# Patient Record
Sex: FEMALE | Race: WHITE | NOT HISPANIC OR LATINO | Employment: UNEMPLOYED | URBAN - METROPOLITAN AREA
[De-identification: names, ages, dates, MRNs, and addresses within clinical notes are randomized per-mention and may not be internally consistent; named-entity substitution may affect disease eponyms.]

---

## 2017-01-01 ENCOUNTER — APPOINTMENT (INPATIENT)
Dept: RADIOLOGY | Facility: HOSPITAL | Age: 82
DRG: 871 | End: 2017-01-01
Attending: GENERAL PRACTICE
Payer: MEDICARE

## 2017-01-01 ENCOUNTER — HOSPITAL ENCOUNTER (INPATIENT)
Facility: HOSPITAL | Age: 82
LOS: 3 days | Discharge: NURSING, REHAB OR LONG-TERM CARE | DRG: 871 | End: 2017-07-31
Attending: EMERGENCY MEDICINE | Admitting: INTERNAL MEDICINE
Payer: MEDICARE

## 2017-01-01 ENCOUNTER — APPOINTMENT (EMERGENCY)
Dept: CT IMAGING | Facility: HOSPITAL | Age: 82
DRG: 189 | End: 2017-01-01
Payer: MEDICARE

## 2017-01-01 ENCOUNTER — HOSPITAL ENCOUNTER (EMERGENCY)
Facility: HOSPITAL | Age: 82
End: 2017-11-20
Attending: EMERGENCY MEDICINE | Admitting: EMERGENCY MEDICINE
Payer: MEDICARE

## 2017-01-01 ENCOUNTER — APPOINTMENT (EMERGENCY)
Dept: CT IMAGING | Facility: HOSPITAL | Age: 82
DRG: 871 | End: 2017-01-01
Payer: MEDICARE

## 2017-01-01 ENCOUNTER — APPOINTMENT (EMERGENCY)
Dept: RADIOLOGY | Facility: HOSPITAL | Age: 82
End: 2017-01-01
Payer: MEDICARE

## 2017-01-01 ENCOUNTER — APPOINTMENT (EMERGENCY)
Dept: CT IMAGING | Facility: HOSPITAL | Age: 82
End: 2017-01-01
Payer: MEDICARE

## 2017-01-01 ENCOUNTER — HOSPITAL ENCOUNTER (INPATIENT)
Facility: HOSPITAL | Age: 82
LOS: 2 days | Discharge: NURSING, REHAB OR LONG-TERM CARE | DRG: 189 | End: 2017-08-07
Attending: EMERGENCY MEDICINE | Admitting: INTERNAL MEDICINE
Payer: MEDICARE

## 2017-01-01 ENCOUNTER — HOSPITAL ENCOUNTER (INPATIENT)
Facility: HOSPITAL | Age: 82
LOS: 5 days | DRG: 871 | End: 2017-11-25
Attending: INTERNAL MEDICINE | Admitting: HOSPITALIST
Payer: MEDICARE

## 2017-01-01 ENCOUNTER — APPOINTMENT (INPATIENT)
Dept: RADIOLOGY | Facility: HOSPITAL | Age: 82
DRG: 871 | End: 2017-01-01
Payer: MEDICARE

## 2017-01-01 ENCOUNTER — APPOINTMENT (EMERGENCY)
Dept: RADIOLOGY | Facility: HOSPITAL | Age: 82
DRG: 871 | End: 2017-01-01
Payer: MEDICARE

## 2017-01-01 ENCOUNTER — APPOINTMENT (EMERGENCY)
Dept: RADIOLOGY | Facility: HOSPITAL | Age: 82
DRG: 189 | End: 2017-01-01
Payer: MEDICARE

## 2017-01-01 VITALS
RESPIRATION RATE: 16 BRPM | WEIGHT: 125.88 LBS | SYSTOLIC BLOOD PRESSURE: 120 MMHG | BODY MASS INDEX: 23.17 KG/M2 | TEMPERATURE: 97.5 F | HEART RATE: 66 BPM | OXYGEN SATURATION: 92 % | HEIGHT: 62 IN | DIASTOLIC BLOOD PRESSURE: 49 MMHG

## 2017-01-01 VITALS
OXYGEN SATURATION: 94 % | DIASTOLIC BLOOD PRESSURE: 66 MMHG | HEIGHT: 60 IN | BODY MASS INDEX: 27.35 KG/M2 | HEART RATE: 82 BPM | TEMPERATURE: 98.1 F | WEIGHT: 139.33 LBS | SYSTOLIC BLOOD PRESSURE: 142 MMHG | RESPIRATION RATE: 16 BRPM

## 2017-01-01 VITALS
TEMPERATURE: 98.7 F | BODY MASS INDEX: 24.58 KG/M2 | RESPIRATION RATE: 18 BRPM | OXYGEN SATURATION: 99 % | WEIGHT: 125.88 LBS | DIASTOLIC BLOOD PRESSURE: 63 MMHG | HEART RATE: 88 BPM | SYSTOLIC BLOOD PRESSURE: 144 MMHG

## 2017-01-01 VITALS
RESPIRATION RATE: 24 BRPM | OXYGEN SATURATION: 98 % | SYSTOLIC BLOOD PRESSURE: 140 MMHG | TEMPERATURE: 97.4 F | WEIGHT: 154.54 LBS | HEART RATE: 83 BPM | DIASTOLIC BLOOD PRESSURE: 65 MMHG | BODY MASS INDEX: 30.18 KG/M2

## 2017-01-01 DIAGNOSIS — R09.02 HYPOXIA: ICD-10-CM

## 2017-01-01 DIAGNOSIS — J44.9 COPD (CHRONIC OBSTRUCTIVE PULMONARY DISEASE) (HCC): Chronic | ICD-10-CM

## 2017-01-01 DIAGNOSIS — J18.9 HCAP (HEALTHCARE-ASSOCIATED PNEUMONIA): ICD-10-CM

## 2017-01-01 DIAGNOSIS — J96.01 ACUTE RESPIRATORY FAILURE WITH HYPOXIA (HCC): ICD-10-CM

## 2017-01-01 DIAGNOSIS — A41.9 SEVERE SEPSIS (HCC): ICD-10-CM

## 2017-01-01 DIAGNOSIS — R65.20 SEVERE SEPSIS (HCC): ICD-10-CM

## 2017-01-01 DIAGNOSIS — N39.0 UTI (URINARY TRACT INFECTION): ICD-10-CM

## 2017-01-01 DIAGNOSIS — J18.9 BILATERAL PNEUMONIA: Primary | ICD-10-CM

## 2017-01-01 DIAGNOSIS — R65.20 SEVERE SEPSIS (HCC): Primary | ICD-10-CM

## 2017-01-01 DIAGNOSIS — J44.1 COPD EXACERBATION (HCC): ICD-10-CM

## 2017-01-01 DIAGNOSIS — J18.9 PNEUMONIA: Primary | ICD-10-CM

## 2017-01-01 DIAGNOSIS — J44.9 COPD (CHRONIC OBSTRUCTIVE PULMONARY DISEASE) (HCC): ICD-10-CM

## 2017-01-01 DIAGNOSIS — R06.03 RESPIRATORY DISTRESS: ICD-10-CM

## 2017-01-01 DIAGNOSIS — A41.9 SEVERE SEPSIS (HCC): Primary | ICD-10-CM

## 2017-01-01 LAB
ALBUMIN SERPL BCP-MCNC: 2.7 G/DL (ref 3.5–5)
ALBUMIN SERPL BCP-MCNC: 3.2 G/DL (ref 3.5–5)
ALBUMIN SERPL BCP-MCNC: 3.5 G/DL (ref 3.5–5)
ALBUMIN SERPL BCP-MCNC: 3.6 G/DL (ref 3.5–5)
ALP SERPL-CCNC: 125 U/L (ref 46–116)
ALP SERPL-CCNC: 141 U/L (ref 46–116)
ALP SERPL-CCNC: 155 U/L (ref 46–116)
ALP SERPL-CCNC: 197 U/L (ref 46–116)
ALT SERPL W P-5'-P-CCNC: 26 U/L (ref 12–78)
ALT SERPL W P-5'-P-CCNC: 29 U/L (ref 12–78)
ALT SERPL W P-5'-P-CCNC: 30 U/L (ref 12–78)
ALT SERPL W P-5'-P-CCNC: 33 U/L (ref 12–78)
AMORPH PHOS CRY URNS QL MICRO: ABNORMAL /HPF
ANION GAP SERPL CALCULATED.3IONS-SCNC: 11 MMOL/L (ref 4–13)
ANION GAP SERPL CALCULATED.3IONS-SCNC: 11 MMOL/L (ref 4–13)
ANION GAP SERPL CALCULATED.3IONS-SCNC: 14 MMOL/L (ref 4–13)
ANION GAP SERPL CALCULATED.3IONS-SCNC: 15 MMOL/L (ref 4–13)
ANION GAP SERPL CALCULATED.3IONS-SCNC: 6 MMOL/L (ref 4–13)
ANION GAP SERPL CALCULATED.3IONS-SCNC: 7 MMOL/L (ref 4–13)
ANION GAP SERPL CALCULATED.3IONS-SCNC: 7 MMOL/L (ref 4–13)
ANION GAP SERPL CALCULATED.3IONS-SCNC: 8 MMOL/L (ref 4–13)
ANION GAP SERPL CALCULATED.3IONS-SCNC: 9 MMOL/L (ref 4–13)
ANION GAP SERPL CALCULATED.3IONS-SCNC: 9 MMOL/L (ref 4–13)
APTT PPP: 27 SECONDS (ref 23–35)
APTT PPP: 30 SECONDS (ref 23–35)
ARTERIAL PATENCY WRIST A: 3
AST SERPL W P-5'-P-CCNC: 19 U/L (ref 5–45)
AST SERPL W P-5'-P-CCNC: 25 U/L (ref 5–45)
AST SERPL W P-5'-P-CCNC: 32 U/L (ref 5–45)
AST SERPL W P-5'-P-CCNC: 52 U/L (ref 5–45)
ATRIAL RATE: 102 BPM
ATRIAL RATE: 220 BPM
ATRIAL RATE: 93 BPM
BACTERIA BLD CULT: NORMAL
BACTERIA UR CULT: NORMAL
BACTERIA UR QL AUTO: ABNORMAL /HPF
BASE EXCESS BLDA CALC-SCNC: -10 MMOL/L (ref -2–3)
BASE EXCESS BLDA CALC-SCNC: 4 MMOL/L (ref -2–3)
BASOPHILS # BLD AUTO: 0.04 THOUSANDS/ΜL (ref 0–0.1)
BASOPHILS # BLD AUTO: 0.05 THOUSANDS/ΜL (ref 0–0.1)
BASOPHILS # BLD MANUAL: 0 THOUSAND/UL (ref 0–0.1)
BASOPHILS NFR BLD AUTO: 0 % (ref 0–1)
BASOPHILS NFR MAR MANUAL: 0 % (ref 0–1)
BILIRUB DIRECT SERPL-MCNC: 0.11 MG/DL (ref 0–0.2)
BILIRUB SERPL-MCNC: 0.3 MG/DL (ref 0.2–1)
BILIRUB SERPL-MCNC: 0.4 MG/DL (ref 0.2–1)
BILIRUB SERPL-MCNC: 0.4 MG/DL (ref 0.2–1)
BILIRUB SERPL-MCNC: 0.6 MG/DL (ref 0.2–1)
BILIRUB UR QL STRIP: NEGATIVE
BUN SERPL-MCNC: 15 MG/DL (ref 5–25)
BUN SERPL-MCNC: 16 MG/DL (ref 5–25)
BUN SERPL-MCNC: 18 MG/DL (ref 5–25)
BUN SERPL-MCNC: 18 MG/DL (ref 5–25)
BUN SERPL-MCNC: 22 MG/DL (ref 5–25)
BUN SERPL-MCNC: 23 MG/DL (ref 5–25)
BUN SERPL-MCNC: 23 MG/DL (ref 5–25)
BUN SERPL-MCNC: 24 MG/DL (ref 5–25)
BUN SERPL-MCNC: 25 MG/DL (ref 5–25)
BUN SERPL-MCNC: 25 MG/DL (ref 5–25)
CA-I BLD-SCNC: 1.37 MMOL/L (ref 1.12–1.32)
CA-I BLD-SCNC: 1.42 MMOL/L (ref 1.12–1.32)
CALCIUM SERPL-MCNC: 10 MG/DL (ref 8.3–10.1)
CALCIUM SERPL-MCNC: 10.1 MG/DL (ref 8.3–10.1)
CALCIUM SERPL-MCNC: 10.2 MG/DL (ref 8.3–10.1)
CALCIUM SERPL-MCNC: 11.1 MG/DL (ref 8.3–10.1)
CALCIUM SERPL-MCNC: 8.2 MG/DL (ref 8.3–10.1)
CALCIUM SERPL-MCNC: 8.3 MG/DL (ref 8.3–10.1)
CALCIUM SERPL-MCNC: 8.4 MG/DL (ref 8.3–10.1)
CALCIUM SERPL-MCNC: 8.8 MG/DL (ref 8.3–10.1)
CALCIUM SERPL-MCNC: 9 MG/DL (ref 8.3–10.1)
CALCIUM SERPL-MCNC: 9.3 MG/DL (ref 8.3–10.1)
CHLORIDE SERPL-SCNC: 102 MMOL/L (ref 100–108)
CHLORIDE SERPL-SCNC: 102 MMOL/L (ref 100–108)
CHLORIDE SERPL-SCNC: 104 MMOL/L (ref 100–108)
CHLORIDE SERPL-SCNC: 104 MMOL/L (ref 100–108)
CHLORIDE SERPL-SCNC: 105 MMOL/L (ref 100–108)
CHLORIDE SERPL-SCNC: 106 MMOL/L (ref 100–108)
CHLORIDE SERPL-SCNC: 115 MMOL/L (ref 100–108)
CHLORIDE SERPL-SCNC: 116 MMOL/L (ref 100–108)
CHLORIDE SERPL-SCNC: 118 MMOL/L (ref 100–108)
CHLORIDE SERPL-SCNC: 99 MMOL/L (ref 100–108)
CLARITY UR: ABNORMAL
CLARITY UR: CLEAR
CLARITY UR: CLEAR
CLARITY, POC: CLEAR
CO2 SERPL-SCNC: 18 MMOL/L (ref 21–32)
CO2 SERPL-SCNC: 20 MMOL/L (ref 21–32)
CO2 SERPL-SCNC: 22 MMOL/L (ref 21–32)
CO2 SERPL-SCNC: 22 MMOL/L (ref 21–32)
CO2 SERPL-SCNC: 24 MMOL/L (ref 21–32)
CO2 SERPL-SCNC: 25 MMOL/L (ref 21–32)
CO2 SERPL-SCNC: 25 MMOL/L (ref 21–32)
CO2 SERPL-SCNC: 26 MMOL/L (ref 21–32)
CO2 SERPL-SCNC: 27 MMOL/L (ref 21–32)
CO2 SERPL-SCNC: 28 MMOL/L (ref 21–32)
COLOR UR: YELLOW
COLOR, POC: YELLOW
CREAT SERPL-MCNC: 0.83 MG/DL (ref 0.6–1.3)
CREAT SERPL-MCNC: 0.84 MG/DL (ref 0.6–1.3)
CREAT SERPL-MCNC: 0.88 MG/DL (ref 0.6–1.3)
CREAT SERPL-MCNC: 0.89 MG/DL (ref 0.6–1.3)
CREAT SERPL-MCNC: 1.02 MG/DL (ref 0.6–1.3)
CREAT SERPL-MCNC: 1.04 MG/DL (ref 0.6–1.3)
CREAT SERPL-MCNC: 1.1 MG/DL (ref 0.6–1.3)
CREAT SERPL-MCNC: 1.16 MG/DL (ref 0.6–1.3)
CREAT SERPL-MCNC: 1.16 MG/DL (ref 0.6–1.3)
CREAT SERPL-MCNC: 1.36 MG/DL (ref 0.6–1.3)
DEPRECATED D DIMER PPP: 1590 NG/ML (FEU) (ref 0–424)
DS:DELIVERY SYSTEM: ABNORMAL
EOSINOPHIL # BLD AUTO: 0.1 THOUSAND/ΜL (ref 0–0.61)
EOSINOPHIL # BLD AUTO: 0.11 THOUSAND/ΜL (ref 0–0.61)
EOSINOPHIL # BLD AUTO: 0.14 THOUSAND/ΜL (ref 0–0.61)
EOSINOPHIL # BLD AUTO: 0.5 THOUSAND/ΜL (ref 0–0.61)
EOSINOPHIL # BLD MANUAL: 0 THOUSAND/UL (ref 0–0.4)
EOSINOPHIL NFR BLD AUTO: 1 % (ref 0–6)
EOSINOPHIL NFR BLD AUTO: 4 % (ref 0–6)
EOSINOPHIL NFR BLD MANUAL: 0 % (ref 0–6)
ERYTHROCYTE [DISTWIDTH] IN BLOOD BY AUTOMATED COUNT: 15.2 % (ref 11.6–15.1)
ERYTHROCYTE [DISTWIDTH] IN BLOOD BY AUTOMATED COUNT: 15.3 % (ref 11.6–15.1)
ERYTHROCYTE [DISTWIDTH] IN BLOOD BY AUTOMATED COUNT: 15.4 % (ref 11.6–15.1)
ERYTHROCYTE [DISTWIDTH] IN BLOOD BY AUTOMATED COUNT: 15.5 % (ref 11.6–15.1)
ERYTHROCYTE [DISTWIDTH] IN BLOOD BY AUTOMATED COUNT: 15.6 % (ref 11.6–15.1)
ERYTHROCYTE [DISTWIDTH] IN BLOOD BY AUTOMATED COUNT: 15.7 % (ref 11.6–15.1)
ERYTHROCYTE [DISTWIDTH] IN BLOOD BY AUTOMATED COUNT: 16.2 % (ref 11.6–15.1)
ERYTHROCYTE [DISTWIDTH] IN BLOOD BY AUTOMATED COUNT: 16.4 % (ref 11.6–15.1)
ERYTHROCYTE [DISTWIDTH] IN BLOOD BY AUTOMATED COUNT: 16.5 % (ref 11.6–15.1)
ERYTHROCYTE [DISTWIDTH] IN BLOOD BY AUTOMATED COUNT: 16.6 % (ref 11.6–15.1)
EXT BILIRUBIN, UA: NEGATIVE
EXT BLOOD URINE: NEGATIVE
EXT GLUCOSE, UA: NEGATIVE
EXT KETONES: NORMAL
EXT NITRITE, UA: NEGATIVE
EXT PH, UA: 7.5
EXT PROTEIN, UA: NORMAL
EXT SPECIFIC GRAVITY, UA: 1.01
EXT UROBILINOGEN: 0.2
FIO2 GAS DIL.REBREATH: 44 L
FLUAV AG SPEC QL: NORMAL
FLUBV AG SPEC QL: NORMAL
GFR SERPL CREATININE-BSD FRML MDRD: 34 ML/MIN/1.73SQ M
GFR SERPL CREATININE-BSD FRML MDRD: 42 ML/MIN/1.73SQ M
GFR SERPL CREATININE-BSD FRML MDRD: 42 ML/MIN/1.73SQ M
GFR SERPL CREATININE-BSD FRML MDRD: 44 ML/MIN/1.73SQ M
GFR SERPL CREATININE-BSD FRML MDRD: 47 ML/MIN/1.73SQ M
GFR SERPL CREATININE-BSD FRML MDRD: 49 ML/MIN/1.73SQ M
GFR SERPL CREATININE-BSD FRML MDRD: 57 ML/MIN/1.73SQ M
GFR SERPL CREATININE-BSD FRML MDRD: 58 ML/MIN/1.73SQ M
GFR SERPL CREATININE-BSD FRML MDRD: 61 ML/MIN/1.73SQ M
GFR SERPL CREATININE-BSD FRML MDRD: 62 ML/MIN/1.73SQ M
GLUCOSE SERPL-MCNC: 105 MG/DL (ref 65–140)
GLUCOSE SERPL-MCNC: 107 MG/DL (ref 65–140)
GLUCOSE SERPL-MCNC: 115 MG/DL (ref 65–140)
GLUCOSE SERPL-MCNC: 115 MG/DL (ref 65–140)
GLUCOSE SERPL-MCNC: 116 MG/DL (ref 65–140)
GLUCOSE SERPL-MCNC: 134 MG/DL (ref 65–140)
GLUCOSE SERPL-MCNC: 134 MG/DL (ref 65–140)
GLUCOSE SERPL-MCNC: 142 MG/DL (ref 65–140)
GLUCOSE SERPL-MCNC: 143 MG/DL (ref 65–140)
GLUCOSE SERPL-MCNC: 146 MG/DL (ref 65–140)
GLUCOSE SERPL-MCNC: 158 MG/DL (ref 65–140)
GLUCOSE SERPL-MCNC: 158 MG/DL (ref 65–140)
GLUCOSE SERPL-MCNC: 161 MG/DL (ref 65–140)
GLUCOSE UR STRIP-MCNC: NEGATIVE MG/DL
HCO3 BLDA-SCNC: 19.4 MMOL/L (ref 24–30)
HCO3 BLDA-SCNC: 28.6 MMOL/L (ref 22–28)
HCT VFR BLD AUTO: 36.3 % (ref 34.8–46.1)
HCT VFR BLD AUTO: 38.1 % (ref 34.8–46.1)
HCT VFR BLD AUTO: 38.7 % (ref 34.8–46.1)
HCT VFR BLD AUTO: 38.8 % (ref 34.8–46.1)
HCT VFR BLD AUTO: 40.8 % (ref 34.8–46.1)
HCT VFR BLD AUTO: 40.9 % (ref 34.8–46.1)
HCT VFR BLD AUTO: 42.5 % (ref 34.8–46.1)
HCT VFR BLD AUTO: 44.1 % (ref 34.8–46.1)
HCT VFR BLD AUTO: 44.1 % (ref 34.8–46.1)
HCT VFR BLD AUTO: 47.9 % (ref 34.8–46.1)
HCT VFR BLD CALC: 41 % (ref 34.8–46.1)
HCT VFR BLD CALC: 42 % (ref 34.8–46.1)
HGB BLD-MCNC: 11.8 G/DL (ref 11.5–15.4)
HGB BLD-MCNC: 12.3 G/DL (ref 11.5–15.4)
HGB BLD-MCNC: 12.4 G/DL (ref 11.5–15.4)
HGB BLD-MCNC: 12.4 G/DL (ref 11.5–15.4)
HGB BLD-MCNC: 13 G/DL (ref 11.5–15.4)
HGB BLD-MCNC: 13.3 G/DL (ref 11.5–15.4)
HGB BLD-MCNC: 13.5 G/DL (ref 11.5–15.4)
HGB BLD-MCNC: 14.1 G/DL (ref 11.5–15.4)
HGB BLD-MCNC: 14.3 G/DL (ref 11.5–15.4)
HGB BLD-MCNC: 15.2 G/DL (ref 11.5–15.4)
HGB BLDA-MCNC: 13.9 G/DL (ref 11.5–15.4)
HGB BLDA-MCNC: 14.3 G/DL (ref 11.5–15.4)
HGB UR QL STRIP.AUTO: ABNORMAL
HGB UR QL STRIP.AUTO: NEGATIVE
HGB UR QL STRIP.AUTO: NEGATIVE
INR PPP: 1.06 (ref 0.86–1.16)
INR PPP: 1.08 (ref 0.86–1.16)
INR PPP: 1.09 (ref 0.86–1.16)
KETONES UR STRIP-MCNC: ABNORMAL MG/DL
L PNEUMO1 AG UR QL IA.RAPID: NEGATIVE
LACTATE SERPL-SCNC: 1.4 MMOL/L (ref 0.5–2)
LACTATE SERPL-SCNC: 1.5 MMOL/L (ref 0.5–2)
LACTATE SERPL-SCNC: 1.6 MMOL/L (ref 0.5–2)
LACTATE SERPL-SCNC: 2.1 MMOL/L (ref 0.5–2)
LACTATE SERPL-SCNC: 2.3 MMOL/L (ref 0.5–2)
LACTATE SERPL-SCNC: 2.3 MMOL/L (ref 0.5–2)
LACTATE SERPL-SCNC: 2.4 MMOL/L (ref 0.5–2)
LACTATE SERPL-SCNC: 2.5 MMOL/L (ref 0.5–2)
LACTATE SERPL-SCNC: 2.7 MMOL/L (ref 0.5–2)
LACTATE SERPL-SCNC: 4.2 MMOL/L (ref 0.5–2)
LEUKOCYTE ESTERASE UR QL STRIP: ABNORMAL
LEUKOCYTE ESTERASE UR QL STRIP: ABNORMAL
LEUKOCYTE ESTERASE UR QL STRIP: NEGATIVE
LIPASE SERPL-CCNC: 85 U/L (ref 73–393)
LYMPHOCYTES # BLD AUTO: 0.7 THOUSAND/UL (ref 0.6–4.47)
LYMPHOCYTES # BLD AUTO: 1.73 THOUSANDS/ΜL (ref 0.6–4.47)
LYMPHOCYTES # BLD AUTO: 1.76 THOUSANDS/ΜL (ref 0.6–4.47)
LYMPHOCYTES # BLD AUTO: 1.91 THOUSANDS/ΜL (ref 0.6–4.47)
LYMPHOCYTES # BLD AUTO: 2.28 THOUSANDS/ΜL (ref 0.6–4.47)
LYMPHOCYTES # BLD AUTO: 5 % (ref 14–44)
LYMPHOCYTES NFR BLD AUTO: 13 % (ref 14–44)
LYMPHOCYTES NFR BLD AUTO: 14 % (ref 14–44)
LYMPHOCYTES NFR BLD AUTO: 15 % (ref 14–44)
LYMPHOCYTES NFR BLD AUTO: 9 % (ref 14–44)
MAGNESIUM SERPL-MCNC: 1.8 MG/DL (ref 1.6–2.6)
MAGNESIUM SERPL-MCNC: 2.2 MG/DL (ref 1.6–2.6)
MAGNESIUM SERPL-MCNC: 2.6 MG/DL (ref 1.6–2.6)
MCH RBC QN AUTO: 30.9 PG (ref 26.8–34.3)
MCH RBC QN AUTO: 31 PG (ref 26.8–34.3)
MCH RBC QN AUTO: 31.2 PG (ref 26.8–34.3)
MCH RBC QN AUTO: 31.2 PG (ref 26.8–34.3)
MCH RBC QN AUTO: 31.3 PG (ref 26.8–34.3)
MCH RBC QN AUTO: 31.3 PG (ref 26.8–34.3)
MCH RBC QN AUTO: 31.5 PG (ref 26.8–34.3)
MCH RBC QN AUTO: 31.6 PG (ref 26.8–34.3)
MCH RBC QN AUTO: 31.7 PG (ref 26.8–34.3)
MCH RBC QN AUTO: 32.4 PG (ref 26.8–34.3)
MCHC RBC AUTO-ENTMCNC: 31.7 G/DL (ref 31.4–37.4)
MCHC RBC AUTO-ENTMCNC: 31.8 G/DL (ref 31.4–37.4)
MCHC RBC AUTO-ENTMCNC: 31.8 G/DL (ref 31.4–37.4)
MCHC RBC AUTO-ENTMCNC: 32 G/DL (ref 31.4–37.4)
MCHC RBC AUTO-ENTMCNC: 32.3 G/DL (ref 31.4–37.4)
MCHC RBC AUTO-ENTMCNC: 32.4 G/DL (ref 31.4–37.4)
MCHC RBC AUTO-ENTMCNC: 32.5 G/DL (ref 31.4–37.4)
MCHC RBC AUTO-ENTMCNC: 32.6 G/DL (ref 31.4–37.4)
MCV RBC AUTO: 97 FL (ref 82–98)
MCV RBC AUTO: 98 FL (ref 82–98)
MCV RBC AUTO: 99 FL (ref 82–98)
MONOCYTES # BLD AUTO: 0.28 THOUSAND/UL (ref 0–1.22)
MONOCYTES # BLD AUTO: 1.03 THOUSAND/ΜL (ref 0.17–1.22)
MONOCYTES # BLD AUTO: 1.16 THOUSAND/ΜL (ref 0.17–1.22)
MONOCYTES # BLD AUTO: 1.18 THOUSAND/ΜL (ref 0.17–1.22)
MONOCYTES # BLD AUTO: 1.23 THOUSAND/ΜL (ref 0.17–1.22)
MONOCYTES NFR BLD AUTO: 6 % (ref 4–12)
MONOCYTES NFR BLD AUTO: 8 % (ref 4–12)
MONOCYTES NFR BLD AUTO: 8 % (ref 4–12)
MONOCYTES NFR BLD AUTO: 9 % (ref 4–12)
MONOCYTES NFR BLD: 2 % (ref 4–12)
MRSA NOSE QL CULT: NORMAL
NEUTROPHILS # BLD AUTO: 10.59 THOUSANDS/ΜL (ref 1.85–7.62)
NEUTROPHILS # BLD AUTO: 12.13 THOUSANDS/ΜL (ref 1.85–7.62)
NEUTROPHILS # BLD AUTO: 17.32 THOUSANDS/ΜL (ref 1.85–7.62)
NEUTROPHILS # BLD AUTO: 8.89 THOUSANDS/ΜL (ref 1.85–7.62)
NEUTROPHILS # BLD MANUAL: 12.94 THOUSAND/UL (ref 1.85–7.62)
NEUTS BAND NFR BLD MANUAL: 1 % (ref 0–8)
NEUTS SEG NFR BLD AUTO: 73 % (ref 43–75)
NEUTS SEG NFR BLD AUTO: 77 % (ref 43–75)
NEUTS SEG NFR BLD AUTO: 77 % (ref 43–75)
NEUTS SEG NFR BLD AUTO: 84 % (ref 43–75)
NEUTS SEG NFR BLD AUTO: 92 % (ref 43–75)
NITRITE UR QL STRIP: NEGATIVE
NITRITE UR QL STRIP: NEGATIVE
NITRITE UR QL STRIP: POSITIVE
NON-SQ EPI CELLS URNS QL MICRO: ABNORMAL /HPF
NT-PROBNP SERPL-MCNC: 1228 PG/ML
NT-PROBNP SERPL-MCNC: 579 PG/ML
P AXIS: 42 DEGREES
P AXIS: 53 DEGREES
PCO2 BLD: 21 MMOL/L (ref 21–32)
PCO2 BLD: 30 MMOL/L (ref 21–32)
PCO2 BLD: 41.5 MM HG (ref 36–44)
PCO2 BLD: 61.4 MM HG (ref 42–50)
PH BLD: 7.11 [PH] (ref 7.3–7.4)
PH BLD: 7.45 [PH] (ref 7.35–7.45)
PH UR STRIP.AUTO: 6 [PH] (ref 4.5–8)
PH UR STRIP.AUTO: 7 [PH] (ref 4.5–8)
PH UR STRIP.AUTO: 7.5 [PH] (ref 4.5–8)
PHOSPHATE SERPL-MCNC: 1.8 MG/DL (ref 2.3–4.1)
PHOSPHATE SERPL-MCNC: 3 MG/DL (ref 2.3–4.1)
PHOSPHATE SERPL-MCNC: 3.1 MG/DL (ref 2.3–4.1)
PLATELET # BLD AUTO: 205 THOUSANDS/UL (ref 149–390)
PLATELET # BLD AUTO: 222 THOUSANDS/UL (ref 149–390)
PLATELET # BLD AUTO: 223 THOUSANDS/UL (ref 149–390)
PLATELET # BLD AUTO: 231 THOUSANDS/UL (ref 149–390)
PLATELET # BLD AUTO: 238 THOUSANDS/UL (ref 149–390)
PLATELET # BLD AUTO: 243 THOUSANDS/UL (ref 149–390)
PLATELET # BLD AUTO: 245 THOUSANDS/UL (ref 149–390)
PLATELET # BLD AUTO: 272 THOUSANDS/UL (ref 149–390)
PLATELET # BLD AUTO: 351 THOUSANDS/UL (ref 149–390)
PLATELET # BLD AUTO: 369 THOUSANDS/UL (ref 149–390)
PLATELET # BLD AUTO: 387 THOUSANDS/UL (ref 149–390)
PLATELET BLD QL SMEAR: ADEQUATE
PMV BLD AUTO: 10 FL (ref 8.9–12.7)
PMV BLD AUTO: 10.1 FL (ref 8.9–12.7)
PMV BLD AUTO: 10.2 FL (ref 8.9–12.7)
PMV BLD AUTO: 10.3 FL (ref 8.9–12.7)
PMV BLD AUTO: 10.4 FL (ref 8.9–12.7)
PMV BLD AUTO: 10.6 FL (ref 8.9–12.7)
PMV BLD AUTO: 10.6 FL (ref 8.9–12.7)
PMV BLD AUTO: 10.9 FL (ref 8.9–12.7)
PMV BLD AUTO: 11 FL (ref 8.9–12.7)
PMV BLD AUTO: 11.1 FL (ref 8.9–12.7)
PMV BLD AUTO: 11.2 FL (ref 8.9–12.7)
PO2 BLD: 84 MM HG (ref 75–129)
PO2 BLD: 91 MM HG (ref 35–45)
POTASSIUM BLD-SCNC: 3.5 MMOL/L (ref 3.5–5.3)
POTASSIUM BLD-SCNC: 4.6 MMOL/L (ref 3.5–5.3)
POTASSIUM SERPL-SCNC: 3.5 MMOL/L (ref 3.5–5.3)
POTASSIUM SERPL-SCNC: 3.6 MMOL/L (ref 3.5–5.3)
POTASSIUM SERPL-SCNC: 3.8 MMOL/L (ref 3.5–5.3)
POTASSIUM SERPL-SCNC: 4.1 MMOL/L (ref 3.5–5.3)
POTASSIUM SERPL-SCNC: 4.2 MMOL/L (ref 3.5–5.3)
POTASSIUM SERPL-SCNC: 4.4 MMOL/L (ref 3.5–5.3)
PR INTERVAL: 148 MS
PR INTERVAL: 170 MS
PROT SERPL-MCNC: 6.8 G/DL (ref 6.4–8.2)
PROT SERPL-MCNC: 8 G/DL (ref 6.4–8.2)
PROT SERPL-MCNC: 8.1 G/DL (ref 6.4–8.2)
PROT SERPL-MCNC: 8.3 G/DL (ref 6.4–8.2)
PROT UR STRIP-MCNC: ABNORMAL MG/DL
PROTHROMBIN TIME: 13.8 SECONDS (ref 12.1–14.4)
PROTHROMBIN TIME: 14.4 SECONDS (ref 12.1–14.4)
PROTHROMBIN TIME: 14.5 SECONDS (ref 12.1–14.4)
QRS AXIS: 11 DEGREES
QRS AXIS: 17 DEGREES
QRS AXIS: 3 DEGREES
QRSD INTERVAL: 70 MS
QRSD INTERVAL: 78 MS
QRSD INTERVAL: 82 MS
QT INTERVAL: 310 MS
QT INTERVAL: 324 MS
QT INTERVAL: 370 MS
QTC INTERVAL: 402 MS
QTC INTERVAL: 406 MS
QTC INTERVAL: 482 MS
RBC # BLD AUTO: 3.72 MILLION/UL (ref 3.81–5.12)
RBC # BLD AUTO: 3.91 MILLION/UL (ref 3.81–5.12)
RBC # BLD AUTO: 3.96 MILLION/UL (ref 3.81–5.12)
RBC # BLD AUTO: 3.96 MILLION/UL (ref 3.81–5.12)
RBC # BLD AUTO: 4.11 MILLION/UL (ref 3.81–5.12)
RBC # BLD AUTO: 4.2 MILLION/UL (ref 3.81–5.12)
RBC # BLD AUTO: 4.33 MILLION/UL (ref 3.81–5.12)
RBC # BLD AUTO: 4.52 MILLION/UL (ref 3.81–5.12)
RBC # BLD AUTO: 4.52 MILLION/UL (ref 3.81–5.12)
RBC # BLD AUTO: 4.92 MILLION/UL (ref 3.81–5.12)
RBC #/AREA URNS AUTO: ABNORMAL /HPF
RSV B RNA SPEC QL NAA+PROBE: NORMAL
S PNEUM AG UR QL: NEGATIVE
SAO2 % BLD FROM PO2: 93 % (ref 95–98)
SAO2 % BLD FROM PO2: 97 % (ref 95–98)
SODIUM BLD-SCNC: 138 MMOL/L (ref 136–145)
SODIUM BLD-SCNC: 141 MMOL/L (ref 136–145)
SODIUM SERPL-SCNC: 137 MMOL/L (ref 136–145)
SODIUM SERPL-SCNC: 137 MMOL/L (ref 136–145)
SODIUM SERPL-SCNC: 138 MMOL/L (ref 136–145)
SODIUM SERPL-SCNC: 139 MMOL/L (ref 136–145)
SODIUM SERPL-SCNC: 139 MMOL/L (ref 136–145)
SODIUM SERPL-SCNC: 141 MMOL/L (ref 136–145)
SODIUM SERPL-SCNC: 141 MMOL/L (ref 136–145)
SODIUM SERPL-SCNC: 143 MMOL/L (ref 136–145)
SODIUM SERPL-SCNC: 145 MMOL/L (ref 136–145)
SODIUM SERPL-SCNC: 145 MMOL/L (ref 136–145)
SP GR UR STRIP.AUTO: 1.01 (ref 1–1.03)
SP GR UR STRIP.AUTO: 1.01 (ref 1–1.03)
SP GR UR STRIP.AUTO: 1.02 (ref 1–1.03)
SPECIMEN SOURCE: ABNORMAL
SPECIMEN SOURCE: ABNORMAL
T WAVE AXIS: 106 DEGREES
T WAVE AXIS: 168 DEGREES
T WAVE AXIS: 214 DEGREES
TOTAL CELLS COUNTED SPEC: 100
TROPONIN I SERPL-MCNC: <0.02 NG/ML
TROPONIN I SERPL-MCNC: <0.02 NG/ML
URATE CRY URNS QL MICRO: ABNORMAL /HPF
UROBILINOGEN UR QL STRIP.AUTO: 0.2 E.U./DL
VENTRICULAR RATE: 102 BPM
VENTRICULAR RATE: 103 BPM
VENTRICULAR RATE: 93 BPM
WBC # BLD AUTO: 12.37 THOUSAND/UL (ref 4.31–10.16)
WBC # BLD AUTO: 12.49 THOUSAND/UL (ref 4.31–10.16)
WBC # BLD AUTO: 13.63 THOUSAND/UL (ref 4.31–10.16)
WBC # BLD AUTO: 13.91 THOUSAND/UL (ref 4.31–10.16)
WBC # BLD AUTO: 14.75 THOUSAND/UL (ref 4.31–10.16)
WBC # BLD AUTO: 15.78 THOUSAND/UL (ref 4.31–10.16)
WBC # BLD AUTO: 18.81 THOUSAND/UL (ref 4.31–10.16)
WBC # BLD AUTO: 20.45 THOUSAND/UL (ref 4.31–10.16)
WBC # BLD AUTO: 20.66 THOUSAND/UL (ref 4.31–10.16)
WBC # BLD AUTO: 23.82 THOUSAND/UL (ref 4.31–10.16)
WBC # BLD EST: NEGATIVE 10*3/UL
WBC #/AREA URNS AUTO: ABNORMAL /HPF

## 2017-01-01 PROCEDURE — 94660 CPAP INITIATION&MGMT: CPT

## 2017-01-01 PROCEDURE — 94640 AIRWAY INHALATION TREATMENT: CPT

## 2017-01-01 PROCEDURE — 85025 COMPLETE CBC W/AUTO DIFF WBC: CPT | Performed by: PHYSICIAN ASSISTANT

## 2017-01-01 PROCEDURE — 83605 ASSAY OF LACTIC ACID: CPT | Performed by: EMERGENCY MEDICINE

## 2017-01-01 PROCEDURE — 85027 COMPLETE CBC AUTOMATED: CPT | Performed by: GENERAL PRACTICE

## 2017-01-01 PROCEDURE — 84484 ASSAY OF TROPONIN QUANT: CPT | Performed by: EMERGENCY MEDICINE

## 2017-01-01 PROCEDURE — 97116 GAIT TRAINING THERAPY: CPT

## 2017-01-01 PROCEDURE — 94664 DEMO&/EVAL PT USE INHALER: CPT

## 2017-01-01 PROCEDURE — 81001 URINALYSIS AUTO W/SCOPE: CPT | Performed by: EMERGENCY MEDICINE

## 2017-01-01 PROCEDURE — 87040 BLOOD CULTURE FOR BACTERIA: CPT | Performed by: EMERGENCY MEDICINE

## 2017-01-01 PROCEDURE — 94760 N-INVAS EAR/PLS OXIMETRY 1: CPT

## 2017-01-01 PROCEDURE — 36415 COLL VENOUS BLD VENIPUNCTURE: CPT | Performed by: EMERGENCY MEDICINE

## 2017-01-01 PROCEDURE — 85610 PROTHROMBIN TIME: CPT | Performed by: PHYSICIAN ASSISTANT

## 2017-01-01 PROCEDURE — 80048 BASIC METABOLIC PNL TOTAL CA: CPT | Performed by: PHYSICIAN ASSISTANT

## 2017-01-01 PROCEDURE — 83735 ASSAY OF MAGNESIUM: CPT | Performed by: PHYSICIAN ASSISTANT

## 2017-01-01 PROCEDURE — 94668 MNPJ CHEST WALL SBSQ: CPT

## 2017-01-01 PROCEDURE — 84100 ASSAY OF PHOSPHORUS: CPT | Performed by: PHYSICIAN ASSISTANT

## 2017-01-01 PROCEDURE — 71010 HB CHEST X-RAY 1 VIEW FRONTAL (PORTABLE): CPT

## 2017-01-01 PROCEDURE — 84295 ASSAY OF SERUM SODIUM: CPT

## 2017-01-01 PROCEDURE — 94644 CONT INHLJ TX 1ST HOUR: CPT

## 2017-01-01 PROCEDURE — 96367 TX/PROPH/DG ADDL SEQ IV INF: CPT

## 2017-01-01 PROCEDURE — 85610 PROTHROMBIN TIME: CPT | Performed by: EMERGENCY MEDICINE

## 2017-01-01 PROCEDURE — 70450 CT HEAD/BRAIN W/O DYE: CPT

## 2017-01-01 PROCEDURE — 83880 ASSAY OF NATRIURETIC PEPTIDE: CPT | Performed by: EMERGENCY MEDICINE

## 2017-01-01 PROCEDURE — 80053 COMPREHEN METABOLIC PANEL: CPT | Performed by: PHYSICIAN ASSISTANT

## 2017-01-01 PROCEDURE — 84132 ASSAY OF SERUM POTASSIUM: CPT

## 2017-01-01 PROCEDURE — 85730 THROMBOPLASTIN TIME PARTIAL: CPT | Performed by: EMERGENCY MEDICINE

## 2017-01-01 PROCEDURE — 82947 ASSAY GLUCOSE BLOOD QUANT: CPT

## 2017-01-01 PROCEDURE — 80048 BASIC METABOLIC PNL TOTAL CA: CPT | Performed by: GENERAL PRACTICE

## 2017-01-01 PROCEDURE — 83735 ASSAY OF MAGNESIUM: CPT | Performed by: GENERAL PRACTICE

## 2017-01-01 PROCEDURE — 83605 ASSAY OF LACTIC ACID: CPT | Performed by: PHYSICIAN ASSISTANT

## 2017-01-01 PROCEDURE — 85379 FIBRIN DEGRADATION QUANT: CPT | Performed by: EMERGENCY MEDICINE

## 2017-01-01 PROCEDURE — 87086 URINE CULTURE/COLONY COUNT: CPT | Performed by: EMERGENCY MEDICINE

## 2017-01-01 PROCEDURE — 85025 COMPLETE CBC W/AUTO DIFF WBC: CPT | Performed by: EMERGENCY MEDICINE

## 2017-01-01 PROCEDURE — 80048 BASIC METABOLIC PNL TOTAL CA: CPT | Performed by: EMERGENCY MEDICINE

## 2017-01-01 PROCEDURE — 96360 HYDRATION IV INFUSION INIT: CPT

## 2017-01-01 PROCEDURE — 85027 COMPLETE CBC AUTOMATED: CPT | Performed by: PHYSICIAN ASSISTANT

## 2017-01-01 PROCEDURE — G8979 MOBILITY GOAL STATUS: HCPCS

## 2017-01-01 PROCEDURE — 36600 WITHDRAWAL OF ARTERIAL BLOOD: CPT

## 2017-01-01 PROCEDURE — 82803 BLOOD GASES ANY COMBINATION: CPT

## 2017-01-01 PROCEDURE — 82948 REAGENT STRIP/BLOOD GLUCOSE: CPT

## 2017-01-01 PROCEDURE — 96361 HYDRATE IV INFUSION ADD-ON: CPT

## 2017-01-01 PROCEDURE — 85007 BL SMEAR W/DIFF WBC COUNT: CPT | Performed by: EMERGENCY MEDICINE

## 2017-01-01 PROCEDURE — 93005 ELECTROCARDIOGRAM TRACING: CPT

## 2017-01-01 PROCEDURE — 71250 CT THORAX DX C-: CPT

## 2017-01-01 PROCEDURE — 93005 ELECTROCARDIOGRAM TRACING: CPT | Performed by: EMERGENCY MEDICINE

## 2017-01-01 PROCEDURE — 84100 ASSAY OF PHOSPHORUS: CPT | Performed by: GENERAL PRACTICE

## 2017-01-01 PROCEDURE — 83605 ASSAY OF LACTIC ACID: CPT | Performed by: INTERNAL MEDICINE

## 2017-01-01 PROCEDURE — 99285 EMERGENCY DEPT VISIT HI MDM: CPT

## 2017-01-01 PROCEDURE — 85014 HEMATOCRIT: CPT

## 2017-01-01 PROCEDURE — 96365 THER/PROPH/DIAG IV INF INIT: CPT

## 2017-01-01 PROCEDURE — G8978 MOBILITY CURRENT STATUS: HCPCS

## 2017-01-01 PROCEDURE — 87798 DETECT AGENT NOS DNA AMP: CPT | Performed by: HOSPITALIST

## 2017-01-01 PROCEDURE — 94762 N-INVAS EAR/PLS OXIMTRY CONT: CPT

## 2017-01-01 PROCEDURE — 80053 COMPREHEN METABOLIC PANEL: CPT | Performed by: EMERGENCY MEDICINE

## 2017-01-01 PROCEDURE — 87449 NOS EACH ORGANISM AG IA: CPT | Performed by: PHYSICIAN ASSISTANT

## 2017-01-01 PROCEDURE — 87081 CULTURE SCREEN ONLY: CPT | Performed by: GENERAL PRACTICE

## 2017-01-01 PROCEDURE — 83605 ASSAY OF LACTIC ACID: CPT | Performed by: HOSPITALIST

## 2017-01-01 PROCEDURE — 80048 BASIC METABOLIC PNL TOTAL CA: CPT | Performed by: HOSPITALIST

## 2017-01-01 PROCEDURE — 85027 COMPLETE CBC AUTOMATED: CPT | Performed by: HOSPITALIST

## 2017-01-01 PROCEDURE — 83690 ASSAY OF LIPASE: CPT | Performed by: EMERGENCY MEDICINE

## 2017-01-01 PROCEDURE — 96366 THER/PROPH/DIAG IV INF ADDON: CPT

## 2017-01-01 PROCEDURE — 85049 AUTOMATED PLATELET COUNT: CPT | Performed by: HOSPITALIST

## 2017-01-01 PROCEDURE — 97163 PT EVAL HIGH COMPLEX 45 MIN: CPT

## 2017-01-01 PROCEDURE — 83605 ASSAY OF LACTIC ACID: CPT | Performed by: GENERAL PRACTICE

## 2017-01-01 PROCEDURE — 74176 CT ABD & PELVIS W/O CONTRAST: CPT

## 2017-01-01 PROCEDURE — 96375 TX/PRO/DX INJ NEW DRUG ADDON: CPT

## 2017-01-01 PROCEDURE — 87086 URINE CULTURE/COLONY COUNT: CPT | Performed by: INTERNAL MEDICINE

## 2017-01-01 PROCEDURE — 80076 HEPATIC FUNCTION PANEL: CPT | Performed by: EMERGENCY MEDICINE

## 2017-01-01 PROCEDURE — 85027 COMPLETE CBC AUTOMATED: CPT | Performed by: EMERGENCY MEDICINE

## 2017-01-01 PROCEDURE — 81002 URINALYSIS NONAUTO W/O SCOPE: CPT | Performed by: EMERGENCY MEDICINE

## 2017-01-01 PROCEDURE — 82330 ASSAY OF CALCIUM: CPT

## 2017-01-01 RX ORDER — BUDESONIDE 0.5 MG/2ML
0.5 INHALANT ORAL
Status: DISCONTINUED | OUTPATIENT
Start: 2017-01-01 | End: 2017-01-01

## 2017-01-01 RX ORDER — SODIUM CHLORIDE FOR INHALATION 0.9 %
3 VIAL, NEBULIZER (ML) INHALATION
Status: DISCONTINUED | OUTPATIENT
Start: 2017-01-01 | End: 2017-01-01

## 2017-01-01 RX ORDER — MORPHINE SULFATE 2 MG/ML
2 INJECTION, SOLUTION INTRAMUSCULAR; INTRAVENOUS
Status: DISCONTINUED | OUTPATIENT
Start: 2017-01-01 | End: 2017-01-01

## 2017-01-01 RX ORDER — LEVALBUTEROL 1.25 MG/.5ML
1.25 SOLUTION, CONCENTRATE RESPIRATORY (INHALATION)
Status: DISCONTINUED | OUTPATIENT
Start: 2017-01-01 | End: 2017-01-01

## 2017-01-01 RX ORDER — 0.9 % SODIUM CHLORIDE 0.9 %
3 VIAL (ML) INJECTION AS NEEDED
Status: DISCONTINUED | OUTPATIENT
Start: 2017-01-01 | End: 2017-01-01 | Stop reason: HOSPADM

## 2017-01-01 RX ORDER — MORPHINE SULFATE 2 MG/ML
2 INJECTION, SOLUTION INTRAMUSCULAR; INTRAVENOUS
Status: DISCONTINUED | OUTPATIENT
Start: 2017-01-01 | End: 2017-01-01 | Stop reason: HOSPADM

## 2017-01-01 RX ORDER — BISACODYL 10 MG
10 SUPPOSITORY, RECTAL RECTAL DAILY PRN
Status: DISCONTINUED | OUTPATIENT
Start: 2017-01-01 | End: 2017-01-01 | Stop reason: HOSPADM

## 2017-01-01 RX ORDER — NITROFURANTOIN 25; 75 MG/1; MG/1
100 CAPSULE ORAL 2 TIMES DAILY
COMMUNITY

## 2017-01-01 RX ORDER — BISACODYL 10 MG
10 SUPPOSITORY, RECTAL RECTAL AS NEEDED
COMMUNITY
End: 2017-01-01 | Stop reason: HOSPADM

## 2017-01-01 RX ORDER — HALOPERIDOL 5 MG/ML
1 INJECTION INTRAMUSCULAR EVERY 6 HOURS PRN
Status: DISCONTINUED | OUTPATIENT
Start: 2017-01-01 | End: 2017-01-01

## 2017-01-01 RX ORDER — MAGNESIUM HYDROXIDE/ALUMINUM HYDROXICE/SIMETHICONE 120; 1200; 1200 MG/30ML; MG/30ML; MG/30ML
30 SUSPENSION ORAL EVERY 6 HOURS PRN
Status: DISCONTINUED | OUTPATIENT
Start: 2017-01-01 | End: 2017-01-01 | Stop reason: HOSPADM

## 2017-01-01 RX ORDER — MIRTAZAPINE 15 MG/1
15 TABLET, FILM COATED ORAL
COMMUNITY
End: 2017-01-01 | Stop reason: HOSPADM

## 2017-01-01 RX ORDER — HYDRALAZINE HYDROCHLORIDE 20 MG/ML
5 INJECTION INTRAMUSCULAR; INTRAVENOUS EVERY 6 HOURS PRN
Status: DISCONTINUED | OUTPATIENT
Start: 2017-01-01 | End: 2017-01-01

## 2017-01-01 RX ORDER — ONDANSETRON 2 MG/ML
4 INJECTION INTRAMUSCULAR; INTRAVENOUS EVERY 6 HOURS PRN
Status: DISCONTINUED | OUTPATIENT
Start: 2017-01-01 | End: 2017-01-01 | Stop reason: HOSPADM

## 2017-01-01 RX ORDER — HEPARIN SODIUM 5000 [USP'U]/ML
5000 INJECTION, SOLUTION INTRAVENOUS; SUBCUTANEOUS EVERY 8 HOURS SCHEDULED
Status: DISCONTINUED | OUTPATIENT
Start: 2017-01-01 | End: 2017-01-01

## 2017-01-01 RX ORDER — MORPHINE SULFATE 20 MG/ML
10 SOLUTION ORAL
COMMUNITY

## 2017-01-01 RX ORDER — MORPHINE SULFATE 2 MG/ML
2 INJECTION, SOLUTION INTRAMUSCULAR; INTRAVENOUS ONCE
Status: COMPLETED | OUTPATIENT
Start: 2017-01-01 | End: 2017-01-01

## 2017-01-01 RX ORDER — MORPHINE SULFATE 4 MG/ML
4 INJECTION, SOLUTION INTRAMUSCULAR; INTRAVENOUS
Status: DISCONTINUED | OUTPATIENT
Start: 2017-01-01 | End: 2017-01-01

## 2017-01-01 RX ORDER — POLYETHYLENE GLYCOL 3350 17 G/17G
17 POWDER, FOR SOLUTION ORAL DAILY
Status: DISCONTINUED | OUTPATIENT
Start: 2017-01-01 | End: 2017-01-01 | Stop reason: HOSPADM

## 2017-01-01 RX ORDER — ALBUMIN, HUMAN INJ 5% 5 %
SOLUTION INTRAVENOUS
Status: COMPLETED
Start: 2017-01-01 | End: 2017-01-01

## 2017-01-01 RX ORDER — HALOPERIDOL 5 MG/ML
2 INJECTION INTRAMUSCULAR ONCE
Status: COMPLETED | OUTPATIENT
Start: 2017-01-01 | End: 2017-01-01

## 2017-01-01 RX ORDER — GUAIFENESIN/DEXTROMETHORPHAN 100-10MG/5
10 SYRUP ORAL EVERY 4 HOURS PRN
Qty: 118 ML | Refills: 0 | Status: SHIPPED | OUTPATIENT
Start: 2017-01-01

## 2017-01-01 RX ORDER — VANCOMYCIN HYDROCHLORIDE 1 G/200ML
15 INJECTION, SOLUTION INTRAVENOUS ONCE
Status: DISCONTINUED | OUTPATIENT
Start: 2017-01-01 | End: 2017-01-01

## 2017-01-01 RX ORDER — MORPHINE SULFATE 2 MG/ML
INJECTION, SOLUTION INTRAMUSCULAR; INTRAVENOUS
Status: COMPLETED
Start: 2017-01-01 | End: 2017-01-01

## 2017-01-01 RX ORDER — CEFDINIR 300 MG/1
300 CAPSULE ORAL EVERY 12 HOURS SCHEDULED
Status: DISCONTINUED | OUTPATIENT
Start: 2017-01-01 | End: 2017-01-01

## 2017-01-01 RX ORDER — MORPHINE SULFATE 100 MG/5ML
5 SOLUTION ORAL
Status: DISCONTINUED | OUTPATIENT
Start: 2017-01-01 | End: 2017-01-01 | Stop reason: HOSPADM

## 2017-01-01 RX ORDER — PAROXETINE HYDROCHLORIDE 20 MG/1
10 TABLET, FILM COATED ORAL DAILY
Status: DISCONTINUED | OUTPATIENT
Start: 2017-01-01 | End: 2017-01-01

## 2017-01-01 RX ORDER — NAPROXEN 500 MG/1
250 TABLET ORAL 2 TIMES DAILY WITH MEALS
COMMUNITY
End: 2017-01-01 | Stop reason: HOSPADM

## 2017-01-01 RX ORDER — SODIUM PHOSPHATE, DIBASIC AND SODIUM PHOSPHATE, MONOBASIC 7; 19 G/133ML; G/133ML
1 ENEMA RECTAL DAILY PRN
Status: DISCONTINUED | OUTPATIENT
Start: 2017-01-01 | End: 2017-01-01 | Stop reason: HOSPADM

## 2017-01-01 RX ORDER — ALENDRONATE SODIUM 35 MG/1
35 TABLET ORAL
Status: DISCONTINUED | OUTPATIENT
Start: 2017-01-01 | End: 2017-01-01 | Stop reason: HOSPADM

## 2017-01-01 RX ORDER — NITROFURANTOIN 25; 75 MG/1; MG/1
100 CAPSULE ORAL 2 TIMES DAILY
COMMUNITY
End: 2017-01-01 | Stop reason: HOSPADM

## 2017-01-01 RX ORDER — IPRATROPIUM BROMIDE AND ALBUTEROL SULFATE 2.5; .5 MG/3ML; MG/3ML
3 SOLUTION RESPIRATORY (INHALATION)
COMMUNITY

## 2017-01-01 RX ORDER — MORPHINE SULFATE 4 MG/ML
4 INJECTION, SOLUTION INTRAMUSCULAR; INTRAVENOUS ONCE
Status: COMPLETED | OUTPATIENT
Start: 2017-01-01 | End: 2017-01-01

## 2017-01-01 RX ORDER — HALOPERIDOL 5 MG/ML
2 INJECTION INTRAMUSCULAR EVERY 6 HOURS PRN
Status: DISCONTINUED | OUTPATIENT
Start: 2017-01-01 | End: 2017-01-01

## 2017-01-01 RX ORDER — LEVOTHYROXINE SODIUM 0.07 MG/1
75 TABLET ORAL
Status: DISCONTINUED | OUTPATIENT
Start: 2017-01-01 | End: 2017-01-01 | Stop reason: HOSPADM

## 2017-01-01 RX ORDER — LEVOTHYROXINE SODIUM 0.07 MG/1
75 TABLET ORAL
Status: DISCONTINUED | OUTPATIENT
Start: 2017-01-01 | End: 2017-01-01

## 2017-01-01 RX ORDER — METHYLPREDNISOLONE SODIUM SUCCINATE 125 MG/2ML
INJECTION, POWDER, LYOPHILIZED, FOR SOLUTION INTRAMUSCULAR; INTRAVENOUS
Status: COMPLETED
Start: 2017-01-01 | End: 2017-01-01

## 2017-01-01 RX ORDER — BISACODYL 10 MG
10 SUPPOSITORY, RECTAL RECTAL DAILY
Status: DISCONTINUED | OUTPATIENT
Start: 2017-01-01 | End: 2017-01-01 | Stop reason: HOSPADM

## 2017-01-01 RX ORDER — POLYETHYLENE GLYCOL 3350 17 G/17G
17 POWDER, FOR SOLUTION ORAL DAILY
Status: DISCONTINUED | OUTPATIENT
Start: 2017-01-01 | End: 2017-01-01

## 2017-01-01 RX ORDER — LEVOFLOXACIN 5 MG/ML
750 INJECTION, SOLUTION INTRAVENOUS ONCE
Status: COMPLETED | OUTPATIENT
Start: 2017-01-01 | End: 2017-01-01

## 2017-01-01 RX ORDER — LORAZEPAM 2 MG/ML
0.5 INJECTION INTRAMUSCULAR EVERY 6 HOURS PRN
Status: DISCONTINUED | OUTPATIENT
Start: 2017-01-01 | End: 2017-01-01

## 2017-01-01 RX ORDER — SODIUM CHLORIDE 9 MG/ML
125 INJECTION, SOLUTION INTRAVENOUS CONTINUOUS
Status: DISCONTINUED | OUTPATIENT
Start: 2017-01-01 | End: 2017-01-01

## 2017-01-01 RX ORDER — BISACODYL 10 MG
10 SUPPOSITORY, RECTAL RECTAL DAILY
COMMUNITY

## 2017-01-01 RX ORDER — TRAMADOL HYDROCHLORIDE 50 MG/1
50 TABLET ORAL EVERY 6 HOURS PRN
COMMUNITY
End: 2017-01-01 | Stop reason: HOSPADM

## 2017-01-01 RX ORDER — SODIUM CHLORIDE FOR INHALATION 0.9 %
VIAL, NEBULIZER (ML) INHALATION
Status: COMPLETED
Start: 2017-01-01 | End: 2017-01-01

## 2017-01-01 RX ORDER — NITROFURANTOIN 25; 75 MG/1; MG/1
100 CAPSULE ORAL 2 TIMES DAILY
Status: DISCONTINUED | OUTPATIENT
Start: 2017-01-01 | End: 2017-01-01

## 2017-01-01 RX ORDER — ALBUTEROL SULFATE 2.5 MG/3ML
5 SOLUTION RESPIRATORY (INHALATION) EVERY 6 HOURS PRN
Status: DISCONTINUED | OUTPATIENT
Start: 2017-01-01 | End: 2017-01-01

## 2017-01-01 RX ORDER — MIRTAZAPINE 15 MG/1
15 TABLET, FILM COATED ORAL
Status: DISCONTINUED | OUTPATIENT
Start: 2017-01-01 | End: 2017-01-01 | Stop reason: HOSPADM

## 2017-01-01 RX ORDER — IPRATROPIUM BROMIDE AND ALBUTEROL SULFATE 2.5; .5 MG/3ML; MG/3ML
3 SOLUTION RESPIRATORY (INHALATION)
Status: DISCONTINUED | OUTPATIENT
Start: 2017-01-01 | End: 2017-01-01

## 2017-01-01 RX ORDER — METHYLPREDNISOLONE SODIUM SUCCINATE 125 MG/2ML
60 INJECTION, POWDER, LYOPHILIZED, FOR SOLUTION INTRAMUSCULAR; INTRAVENOUS EVERY 6 HOURS SCHEDULED
Status: DISCONTINUED | OUTPATIENT
Start: 2017-01-01 | End: 2017-01-01

## 2017-01-01 RX ORDER — DOCUSATE SODIUM 100 MG/1
100 CAPSULE, LIQUID FILLED ORAL 2 TIMES DAILY
Status: DISCONTINUED | OUTPATIENT
Start: 2017-01-01 | End: 2017-01-01

## 2017-01-01 RX ORDER — GUAIFENESIN/DEXTROMETHORPHAN 100-10MG/5
10 SYRUP ORAL EVERY 4 HOURS PRN
Status: DISCONTINUED | OUTPATIENT
Start: 2017-01-01 | End: 2017-01-01

## 2017-01-01 RX ORDER — HYDRALAZINE HYDROCHLORIDE 20 MG/ML
5 INJECTION INTRAMUSCULAR; INTRAVENOUS EVERY 6 HOURS PRN
Status: DISCONTINUED | OUTPATIENT
Start: 2017-01-01 | End: 2017-01-01 | Stop reason: HOSPADM

## 2017-01-01 RX ORDER — VANCOMYCIN HYDROCHLORIDE 1 G/200ML
1000 INJECTION, SOLUTION INTRAVENOUS ONCE
Status: COMPLETED | OUTPATIENT
Start: 2017-01-01 | End: 2017-01-01

## 2017-01-01 RX ORDER — ALBUTEROL SULFATE 90 UG/1
2 AEROSOL, METERED RESPIRATORY (INHALATION) 4 TIMES DAILY
COMMUNITY
End: 2017-01-01 | Stop reason: HOSPADM

## 2017-01-01 RX ORDER — MINERAL OIL AND PETROLATUM 150; 830 MG/G; MG/G
OINTMENT OPHTHALMIC 2 TIMES DAILY
Status: DISCONTINUED | OUTPATIENT
Start: 2017-01-01 | End: 2017-01-01 | Stop reason: HOSPADM

## 2017-01-01 RX ORDER — MORPHINE SULFATE 2 MG/ML
2 INJECTION, SOLUTION INTRAMUSCULAR; INTRAVENOUS EVERY 2 HOUR PRN
Status: DISCONTINUED | OUTPATIENT
Start: 2017-01-01 | End: 2017-01-01

## 2017-01-01 RX ORDER — LEVALBUTEROL 1.25 MG/.5ML
1.25 SOLUTION, CONCENTRATE RESPIRATORY (INHALATION)
Status: DISCONTINUED | OUTPATIENT
Start: 2017-01-01 | End: 2017-01-01 | Stop reason: HOSPADM

## 2017-01-01 RX ORDER — GUAIFENESIN/DEXTROMETHORPHAN 100-10MG/5
10 SYRUP ORAL EVERY 4 HOURS PRN
Status: DISCONTINUED | OUTPATIENT
Start: 2017-01-01 | End: 2017-01-01 | Stop reason: HOSPADM

## 2017-01-01 RX ORDER — ALBUTEROL SULFATE 2.5 MG/3ML
2.5 SOLUTION RESPIRATORY (INHALATION) EVERY 6 HOURS PRN
Qty: 75 ML | Refills: 0 | Status: SHIPPED | OUTPATIENT
Start: 2017-01-01 | End: 2017-01-01 | Stop reason: HOSPADM

## 2017-01-01 RX ORDER — POLYETHYLENE GLYCOL 3350 17 G/17G
17 POWDER, FOR SOLUTION ORAL DAILY
Qty: 14 EACH | Refills: 0 | Status: SHIPPED | OUTPATIENT
Start: 2017-01-01 | End: 2017-01-01 | Stop reason: HOSPADM

## 2017-01-01 RX ORDER — CEFDINIR 300 MG/1
300 CAPSULE ORAL EVERY 12 HOURS SCHEDULED
Qty: 6 CAPSULE | Refills: 0 | Status: SHIPPED | OUTPATIENT
Start: 2017-01-01 | End: 2017-01-01

## 2017-01-01 RX ORDER — METHYLPREDNISOLONE SODIUM SUCCINATE 125 MG/2ML
125 INJECTION, POWDER, LYOPHILIZED, FOR SOLUTION INTRAMUSCULAR; INTRAVENOUS ONCE
Status: COMPLETED | OUTPATIENT
Start: 2017-01-01 | End: 2017-01-01

## 2017-01-01 RX ORDER — MIRTAZAPINE 15 MG/1
15 TABLET, FILM COATED ORAL
Status: DISCONTINUED | OUTPATIENT
Start: 2017-01-01 | End: 2017-01-01

## 2017-01-01 RX ORDER — SENNOSIDES 8.6 MG
1 TABLET ORAL DAILY
Status: DISCONTINUED | OUTPATIENT
Start: 2017-01-01 | End: 2017-01-01

## 2017-01-01 RX ORDER — ACETAMINOPHEN 650 MG/1
650 SUPPOSITORY RECTAL EVERY 4 HOURS PRN
Status: DISCONTINUED | OUTPATIENT
Start: 2017-01-01 | End: 2017-01-01 | Stop reason: HOSPADM

## 2017-01-01 RX ORDER — ACETAMINOPHEN 160 MG/5ML
650 SUSPENSION, ORAL (FINAL DOSE FORM) ORAL ONCE
Status: COMPLETED | OUTPATIENT
Start: 2017-01-01 | End: 2017-01-01

## 2017-01-01 RX ORDER — SODIUM CHLORIDE, SODIUM LACTATE, POTASSIUM CHLORIDE, CALCIUM CHLORIDE 600; 310; 30; 20 MG/100ML; MG/100ML; MG/100ML; MG/100ML
1000 INJECTION, SOLUTION INTRAVENOUS CONTINUOUS
Status: DISCONTINUED | OUTPATIENT
Start: 2017-01-01 | End: 2017-01-01

## 2017-01-01 RX ORDER — LEVALBUTEROL 1.25 MG/.5ML
1.25 SOLUTION, CONCENTRATE RESPIRATORY (INHALATION) ONCE
Status: COMPLETED | OUTPATIENT
Start: 2017-01-01 | End: 2017-01-01

## 2017-01-01 RX ORDER — GUAIFENESIN 600 MG
600 TABLET, EXTENDED RELEASE 12 HR ORAL EVERY 12 HOURS SCHEDULED
Status: DISCONTINUED | OUTPATIENT
Start: 2017-01-01 | End: 2017-01-01 | Stop reason: HOSPADM

## 2017-01-01 RX ORDER — SODIUM CHLORIDE 9 MG/ML
75 INJECTION, SOLUTION INTRAVENOUS CONTINUOUS
Status: DISCONTINUED | OUTPATIENT
Start: 2017-01-01 | End: 2017-01-01

## 2017-01-01 RX ORDER — BUDESONIDE 0.5 MG/2ML
0.5 INHALANT ORAL 2 TIMES DAILY
COMMUNITY
End: 2017-01-01 | Stop reason: HOSPADM

## 2017-01-01 RX ORDER — POLYVINYL ALCOHOL 14 MG/ML
1 SOLUTION/ DROPS OPHTHALMIC AS NEEDED
Status: DISCONTINUED | OUTPATIENT
Start: 2017-01-01 | End: 2017-01-01 | Stop reason: HOSPADM

## 2017-01-01 RX ORDER — HALOPERIDOL 5 MG/ML
1 INJECTION INTRAMUSCULAR
Status: DISCONTINUED | OUTPATIENT
Start: 2017-01-01 | End: 2017-01-01 | Stop reason: HOSPADM

## 2017-01-01 RX ORDER — PERPHENAZINE/AMITRIPTYLINE HCL 2 MG-25 MG
TABLET ORAL
COMMUNITY
End: 2017-01-01

## 2017-01-01 RX ORDER — METHYLPREDNISOLONE SODIUM SUCCINATE 40 MG/ML
40 INJECTION, POWDER, LYOPHILIZED, FOR SOLUTION INTRAMUSCULAR; INTRAVENOUS EVERY 12 HOURS SCHEDULED
Status: DISCONTINUED | OUTPATIENT
Start: 2017-01-01 | End: 2017-01-01

## 2017-01-01 RX ORDER — PAROXETINE HYDROCHLORIDE 20 MG/1
10 TABLET, FILM COATED ORAL DAILY
Status: DISCONTINUED | OUTPATIENT
Start: 2017-01-01 | End: 2017-01-01 | Stop reason: HOSPADM

## 2017-01-01 RX ORDER — MORPHINE SULFATE 100 MG/5ML
5 SOLUTION ORAL
Qty: 15 ML | Refills: 0 | Status: SHIPPED | OUTPATIENT
Start: 2017-01-01 | End: 2017-01-01

## 2017-01-01 RX ORDER — POLYVINYL ALCOHOL 14 MG/ML
1 SOLUTION/ DROPS OPHTHALMIC AS NEEDED
COMMUNITY
End: 2017-01-01

## 2017-01-01 RX ORDER — AMOXICILLIN 250 MG
1 CAPSULE ORAL 2 TIMES DAILY
Status: DISCONTINUED | OUTPATIENT
Start: 2017-01-01 | End: 2017-01-01 | Stop reason: HOSPADM

## 2017-01-01 RX ORDER — HEPARIN SODIUM 5000 [USP'U]/ML
5000 INJECTION, SOLUTION INTRAVENOUS; SUBCUTANEOUS EVERY 8 HOURS SCHEDULED
Status: DISCONTINUED | OUTPATIENT
Start: 2017-01-01 | End: 2017-01-01 | Stop reason: HOSPADM

## 2017-01-01 RX ORDER — ALBUTEROL SULFATE 2.5 MG/3ML
2.5 SOLUTION RESPIRATORY (INHALATION) EVERY 6 HOURS PRN
Status: DISCONTINUED | OUTPATIENT
Start: 2017-01-01 | End: 2017-01-01 | Stop reason: HOSPADM

## 2017-01-01 RX ORDER — ACETAMINOPHEN 325 MG/1
650 TABLET ORAL EVERY 4 HOURS PRN
COMMUNITY

## 2017-01-01 RX ORDER — SODIUM CHLORIDE 9 MG/ML
50 INJECTION, SOLUTION INTRAVENOUS CONTINUOUS
Status: DISCONTINUED | OUTPATIENT
Start: 2017-01-01 | End: 2017-01-01

## 2017-01-01 RX ORDER — LACTULOSE 10 G/15ML
10 SOLUTION ORAL DAILY
Status: DISCONTINUED | OUTPATIENT
Start: 2017-01-01 | End: 2017-01-01

## 2017-01-01 RX ORDER — ACETAMINOPHEN 160 MG/5ML
650 SUSPENSION, ORAL (FINAL DOSE FORM) ORAL EVERY 6 HOURS PRN
Status: DISCONTINUED | OUTPATIENT
Start: 2017-01-01 | End: 2017-01-01 | Stop reason: SDUPTHER

## 2017-01-01 RX ORDER — ONDANSETRON 2 MG/ML
4 INJECTION INTRAMUSCULAR; INTRAVENOUS ONCE
Status: COMPLETED | OUTPATIENT
Start: 2017-01-01 | End: 2017-01-01

## 2017-01-01 RX ORDER — DIPHENHYDRAMINE HYDROCHLORIDE 50 MG/ML
12.5 INJECTION INTRAMUSCULAR; INTRAVENOUS ONCE
Status: COMPLETED | OUTPATIENT
Start: 2017-01-01 | End: 2017-01-01

## 2017-01-01 RX ORDER — METHYLPREDNISOLONE SODIUM SUCCINATE 125 MG/2ML
125 INJECTION, POWDER, LYOPHILIZED, FOR SOLUTION INTRAMUSCULAR; INTRAVENOUS EVERY 6 HOURS SCHEDULED
Status: DISCONTINUED | OUTPATIENT
Start: 2017-01-01 | End: 2017-01-01

## 2017-01-01 RX ORDER — LEVOTHYROXINE SODIUM 0.07 MG/1
75 TABLET ORAL DAILY
COMMUNITY
End: 2017-01-01 | Stop reason: HOSPADM

## 2017-01-01 RX ORDER — MORPHINE SULFATE 2 MG/ML
2 INJECTION, SOLUTION INTRAMUSCULAR; INTRAVENOUS EVERY 4 HOURS PRN
Status: DISCONTINUED | OUTPATIENT
Start: 2017-01-01 | End: 2017-01-01

## 2017-01-01 RX ORDER — ALBUTEROL SULFATE 90 UG/1
2 AEROSOL, METERED RESPIRATORY (INHALATION) 4 TIMES DAILY
Status: DISCONTINUED | OUTPATIENT
Start: 2017-01-01 | End: 2017-01-01

## 2017-01-01 RX ORDER — ALBUTEROL SULFATE 2.5 MG/3ML
2.5 SOLUTION RESPIRATORY (INHALATION) EVERY 6 HOURS PRN
Status: DISCONTINUED | OUTPATIENT
Start: 2017-01-01 | End: 2017-01-01

## 2017-01-01 RX ORDER — FUROSEMIDE 10 MG/ML
20 INJECTION INTRAMUSCULAR; INTRAVENOUS ONCE
Status: DISCONTINUED | OUTPATIENT
Start: 2017-01-01 | End: 2017-01-01

## 2017-01-01 RX ORDER — LORAZEPAM 2 MG/ML
0.5 INJECTION INTRAMUSCULAR
Status: DISCONTINUED | OUTPATIENT
Start: 2017-01-01 | End: 2017-01-01 | Stop reason: HOSPADM

## 2017-01-01 RX ORDER — AZITHROMYCIN 500 MG/1
500 INJECTION, POWDER, LYOPHILIZED, FOR SOLUTION INTRAVENOUS ONCE
Status: DISCONTINUED | OUTPATIENT
Start: 2017-01-01 | End: 2017-01-01 | Stop reason: SDUPTHER

## 2017-01-01 RX ORDER — PAROXETINE 10 MG/1
10 TABLET, FILM COATED ORAL DAILY
COMMUNITY
End: 2017-01-01 | Stop reason: HOSPADM

## 2017-01-01 RX ORDER — ACETAMINOPHEN 325 MG/1
650 TABLET ORAL EVERY 6 HOURS PRN
Status: DISCONTINUED | OUTPATIENT
Start: 2017-01-01 | End: 2017-01-01 | Stop reason: HOSPADM

## 2017-01-01 RX ORDER — POLYVINYL ALCOHOL 14 MG/ML
2 SOLUTION/ DROPS OPHTHALMIC AS NEEDED
Status: DISCONTINUED | OUTPATIENT
Start: 2017-01-01 | End: 2017-01-01 | Stop reason: HOSPADM

## 2017-01-01 RX ORDER — ANTIOX #8/OM3/DHA/EPA/LUT/ZEAX 250-2.5 MG
CAPSULE ORAL
COMMUNITY

## 2017-01-01 RX ORDER — DIPHENHYDRAMINE HYDROCHLORIDE 50 MG/ML
INJECTION INTRAMUSCULAR; INTRAVENOUS
Status: COMPLETED
Start: 2017-01-01 | End: 2017-01-01

## 2017-01-01 RX ORDER — MORPHINE SULFATE 4 MG/ML
4 INJECTION, SOLUTION INTRAMUSCULAR; INTRAVENOUS EVERY 6 HOURS SCHEDULED
Status: DISCONTINUED | OUTPATIENT
Start: 2017-01-01 | End: 2017-01-01 | Stop reason: HOSPADM

## 2017-01-01 RX ORDER — ALBUTEROL SULFATE 2.5 MG/3ML
10 SOLUTION RESPIRATORY (INHALATION) ONCE
Status: COMPLETED | OUTPATIENT
Start: 2017-01-01 | End: 2017-01-01

## 2017-01-01 RX ORDER — LACTULOSE 10 G/10G
10 SOLUTION ORAL DAILY
COMMUNITY
End: 2017-01-01 | Stop reason: HOSPADM

## 2017-01-01 RX ORDER — METHYLPREDNISOLONE SODIUM SUCCINATE 40 MG/ML
40 INJECTION, POWDER, LYOPHILIZED, FOR SOLUTION INTRAMUSCULAR; INTRAVENOUS DAILY
Status: DISCONTINUED | OUTPATIENT
Start: 2017-01-01 | End: 2017-01-01 | Stop reason: HOSPADM

## 2017-01-01 RX ORDER — MINERAL OIL AND PETROLATUM 150; 830 MG/G; MG/G
OINTMENT OPHTHALMIC 2 TIMES DAILY
COMMUNITY

## 2017-01-01 RX ORDER — MORPHINE SULFATE 100 MG/5ML
5 SOLUTION ORAL EVERY 4 HOURS PRN
Status: DISCONTINUED | OUTPATIENT
Start: 2017-01-01 | End: 2017-01-01

## 2017-01-01 RX ORDER — DIPHENHYDRAMINE HYDROCHLORIDE 50 MG/ML
50 INJECTION INTRAMUSCULAR; INTRAVENOUS EVERY 6 HOURS
Status: DISCONTINUED | OUTPATIENT
Start: 2017-01-01 | End: 2017-01-01

## 2017-01-01 RX ORDER — METHYLPREDNISOLONE SODIUM SUCCINATE 40 MG/ML
40 INJECTION, POWDER, LYOPHILIZED, FOR SOLUTION INTRAMUSCULAR; INTRAVENOUS EVERY 8 HOURS SCHEDULED
Status: DISCONTINUED | OUTPATIENT
Start: 2017-01-01 | End: 2017-01-01

## 2017-01-01 RX ORDER — ALENDRONATE SODIUM 35 MG/1
35 TABLET ORAL
COMMUNITY
End: 2017-01-01 | Stop reason: HOSPADM

## 2017-01-01 RX ADMIN — LEVALBUTEROL HYDROCHLORIDE 1.25 MG: 1.25 SOLUTION, CONCENTRATE RESPIRATORY (INHALATION) at 13:07

## 2017-01-01 RX ADMIN — MIRTAZAPINE 15 MG: 15 TABLET, FILM COATED ORAL at 21:23

## 2017-01-01 RX ADMIN — METHYLPREDNISOLONE SODIUM SUCCINATE 125 MG: 125 INJECTION, POWDER, FOR SOLUTION INTRAMUSCULAR; INTRAVENOUS at 08:15

## 2017-01-01 RX ADMIN — SODIUM CHLORIDE 1000 ML: 0.9 INJECTION, SOLUTION INTRAVENOUS at 18:30

## 2017-01-01 RX ADMIN — IPRATROPIUM BROMIDE 0.5 MG: 0.5 SOLUTION RESPIRATORY (INHALATION) at 07:15

## 2017-01-01 RX ADMIN — HEPARIN SODIUM 5000 UNITS: 5000 INJECTION, SOLUTION INTRAVENOUS; SUBCUTANEOUS at 09:14

## 2017-01-01 RX ADMIN — CEFEPIME HYDROCHLORIDE 1000 MG: 1 INJECTION, POWDER, FOR SOLUTION INTRAMUSCULAR; INTRAVENOUS at 21:02

## 2017-01-01 RX ADMIN — SODIUM CHLORIDE 250 ML: 0.9 INJECTION, SOLUTION INTRAVENOUS at 04:36

## 2017-01-01 RX ADMIN — IPRATROPIUM BROMIDE 0.5 MG: 0.5 SOLUTION RESPIRATORY (INHALATION) at 23:42

## 2017-01-01 RX ADMIN — MORPHINE SULFATE 2 MG: 2 INJECTION, SOLUTION INTRAMUSCULAR; INTRAVENOUS at 00:41

## 2017-01-01 RX ADMIN — LEVALBUTEROL 1.25 MG: 1.25 SOLUTION, CONCENTRATE RESPIRATORY (INHALATION) at 13:42

## 2017-01-01 RX ADMIN — CALCIUM ACETATE 1334 MG: 667 CAPSULE ORAL at 08:35

## 2017-01-01 RX ADMIN — MINERAL OIL AND WHITE PETROLATUM: 150; 830 OINTMENT OPHTHALMIC at 18:46

## 2017-01-01 RX ADMIN — METHYLPREDNISOLONE SODIUM SUCCINATE 40 MG: 40 INJECTION, POWDER, FOR SOLUTION INTRAMUSCULAR; INTRAVENOUS at 08:39

## 2017-01-01 RX ADMIN — METHYLPREDNISOLONE SODIUM SUCCINATE 125 MG: 125 INJECTION, POWDER, FOR SOLUTION INTRAMUSCULAR; INTRAVENOUS at 01:11

## 2017-01-01 RX ADMIN — STANDARDIZED SENNA CONCENTRATE AND DOCUSATE SODIUM 1 TABLET: 8.6; 5 TABLET, FILM COATED ORAL at 08:36

## 2017-01-01 RX ADMIN — POLYETHYLENE GLYCOL 3350 17 G: 17 POWDER, FOR SOLUTION ORAL at 08:35

## 2017-01-01 RX ADMIN — MORPHINE SULFATE 2 MG: 2 INJECTION, SOLUTION INTRAMUSCULAR; INTRAVENOUS at 07:38

## 2017-01-01 RX ADMIN — MINERAL OIL AND WHITE PETROLATUM: 150; 830 OINTMENT OPHTHALMIC at 08:49

## 2017-01-01 RX ADMIN — FAMOTIDINE 20 MG: 10 INJECTION, SOLUTION INTRAVENOUS at 08:28

## 2017-01-01 RX ADMIN — MORPHINE SULFATE 4 MG: 4 INJECTION INTRAVENOUS at 01:12

## 2017-01-01 RX ADMIN — ONDANSETRON 4 MG: 2 INJECTION INTRAMUSCULAR; INTRAVENOUS at 16:21

## 2017-01-01 RX ADMIN — ISODIUM CHLORIDE 3 ML: 0.03 SOLUTION RESPIRATORY (INHALATION) at 07:14

## 2017-01-01 RX ADMIN — DIPHENHYDRAMINE HYDROCHLORIDE 50 MG: 50 INJECTION, SOLUTION INTRAMUSCULAR; INTRAVENOUS at 13:07

## 2017-01-01 RX ADMIN — METHYLPREDNISOLONE SODIUM SUCCINATE 40 MG: 40 INJECTION, POWDER, FOR SOLUTION INTRAMUSCULAR; INTRAVENOUS at 20:32

## 2017-01-01 RX ADMIN — METRONIDAZOLE 500 MG: 500 INJECTION, SOLUTION INTRAVENOUS at 13:06

## 2017-01-01 RX ADMIN — MORPHINE SULFATE 4 MG: 4 INJECTION INTRAVENOUS at 17:15

## 2017-01-01 RX ADMIN — SODIUM CHLORIDE, SODIUM LACTATE, POTASSIUM CHLORIDE, AND CALCIUM CHLORIDE 1000 ML: .6; .31; .03; .02 INJECTION, SOLUTION INTRAVENOUS at 17:07

## 2017-01-01 RX ADMIN — IPRATROPIUM BROMIDE 0.5 MG: 0.5 SOLUTION RESPIRATORY (INHALATION) at 01:15

## 2017-01-01 RX ADMIN — DIPHENHYDRAMINE HYDROCHLORIDE 50 MG: 50 INJECTION, SOLUTION INTRAMUSCULAR; INTRAVENOUS at 08:15

## 2017-01-01 RX ADMIN — IPRATROPIUM BROMIDE 0.5 MG: 0.5 SOLUTION RESPIRATORY (INHALATION) at 21:47

## 2017-01-01 RX ADMIN — ISODIUM CHLORIDE 3 ML: 0.03 SOLUTION RESPIRATORY (INHALATION) at 19:39

## 2017-01-01 RX ADMIN — VANCOMYCIN HYDROCHLORIDE 750 MG: 750 INJECTION, SOLUTION INTRAVENOUS at 05:13

## 2017-01-01 RX ADMIN — IPRATROPIUM BROMIDE 0.5 MG: 0.5 SOLUTION RESPIRATORY (INHALATION) at 15:12

## 2017-01-01 RX ADMIN — METHYLPREDNISOLONE SODIUM SUCCINATE 125 MG: 125 INJECTION, POWDER, FOR SOLUTION INTRAMUSCULAR; INTRAVENOUS at 17:16

## 2017-01-01 RX ADMIN — LEVALBUTEROL HYDROCHLORIDE 1.25 MG: 1.25 SOLUTION, CONCENTRATE RESPIRATORY (INHALATION) at 07:15

## 2017-01-01 RX ADMIN — LEVOFLOXACIN 750 MG: 5 INJECTION, SOLUTION INTRAVENOUS at 00:29

## 2017-01-01 RX ADMIN — METHYLPREDNISOLONE SODIUM SUCCINATE 40 MG: 40 INJECTION, POWDER, FOR SOLUTION INTRAMUSCULAR; INTRAVENOUS at 08:49

## 2017-01-01 RX ADMIN — ENOXAPARIN SODIUM 30 MG: 30 INJECTION SUBCUTANEOUS at 08:26

## 2017-01-01 RX ADMIN — IPRATROPIUM BROMIDE 0.5 MG: 0.5 SOLUTION RESPIRATORY (INHALATION) at 19:25

## 2017-01-01 RX ADMIN — METRONIDAZOLE 500 MG: 500 INJECTION, SOLUTION INTRAVENOUS at 05:07

## 2017-01-01 RX ADMIN — IPRATROPIUM BROMIDE 0.5 MG: 0.5 SOLUTION RESPIRATORY (INHALATION) at 07:57

## 2017-01-01 RX ADMIN — MIRTAZAPINE 15 MG: 15 TABLET, FILM COATED ORAL at 21:33

## 2017-01-01 RX ADMIN — HEPARIN SODIUM 5000 UNITS: 5000 INJECTION, SOLUTION INTRAVENOUS; SUBCUTANEOUS at 07:31

## 2017-01-01 RX ADMIN — CALCIUM ACETATE 1334 MG: 667 CAPSULE ORAL at 09:26

## 2017-01-01 RX ADMIN — ISODIUM CHLORIDE 3 ML: 0.03 SOLUTION RESPIRATORY (INHALATION) at 13:10

## 2017-01-01 RX ADMIN — CALCIUM ACETATE 1334 MG: 667 CAPSULE ORAL at 13:46

## 2017-01-01 RX ADMIN — METRONIDAZOLE 500 MG: 500 INJECTION, SOLUTION INTRAVENOUS at 05:57

## 2017-01-01 RX ADMIN — PAROXETINE HYDROCHLORIDE 10 MG: 20 TABLET, FILM COATED ORAL at 08:36

## 2017-01-01 RX ADMIN — AZTREONAM 1000 MG: 1 INJECTION, POWDER, LYOPHILIZED, FOR SOLUTION INTRAMUSCULAR; INTRAVENOUS at 11:40

## 2017-01-01 RX ADMIN — MORPHINE SULFATE 5 MG: 100 SOLUTION ORAL at 00:43

## 2017-01-01 RX ADMIN — SODIUM CHLORIDE 1000 ML: 0.9 INJECTION, SOLUTION INTRAVENOUS at 19:58

## 2017-01-01 RX ADMIN — LEVALBUTEROL 1.25 MG: 1.25 SOLUTION, CONCENTRATE RESPIRATORY (INHALATION) at 00:31

## 2017-01-01 RX ADMIN — MINERAL OIL AND WHITE PETROLATUM: 150; 830 OINTMENT OPHTHALMIC at 23:22

## 2017-01-01 RX ADMIN — SODIUM CHLORIDE, SODIUM LACTATE, POTASSIUM CHLORIDE, AND CALCIUM CHLORIDE 1000 ML: .6; .31; .03; .02 INJECTION, SOLUTION INTRAVENOUS at 14:35

## 2017-01-01 RX ADMIN — HEPARIN SODIUM 5000 UNITS: 5000 INJECTION, SOLUTION INTRAVENOUS; SUBCUTANEOUS at 05:43

## 2017-01-01 RX ADMIN — PAROXETINE HYDROCHLORIDE 10 MG: 20 TABLET, FILM COATED ORAL at 09:03

## 2017-01-01 RX ADMIN — HEPARIN SODIUM 5000 UNITS: 5000 INJECTION, SOLUTION INTRAVENOUS; SUBCUTANEOUS at 21:02

## 2017-01-01 RX ADMIN — MORPHINE SULFATE 2 MG: 2 INJECTION, SOLUTION INTRAMUSCULAR; INTRAVENOUS at 03:13

## 2017-01-01 RX ADMIN — LEVALBUTEROL 1.25 MG: 1.25 SOLUTION, CONCENTRATE RESPIRATORY (INHALATION) at 08:44

## 2017-01-01 RX ADMIN — LEVALBUTEROL 1.25 MG: 1.25 SOLUTION, CONCENTRATE RESPIRATORY (INHALATION) at 13:22

## 2017-01-01 RX ADMIN — METRONIDAZOLE 500 MG: 500 INJECTION, SOLUTION INTRAVENOUS at 13:08

## 2017-01-01 RX ADMIN — SODIUM CHLORIDE 75 ML/HR: 0.9 INJECTION, SOLUTION INTRAVENOUS at 09:20

## 2017-01-01 RX ADMIN — HEPARIN SODIUM 5000 UNITS: 5000 INJECTION, SOLUTION INTRAVENOUS; SUBCUTANEOUS at 21:33

## 2017-01-01 RX ADMIN — SODIUM CHLORIDE 125 ML/HR: 0.9 INJECTION, SOLUTION INTRAVENOUS at 09:14

## 2017-01-01 RX ADMIN — AZTREONAM 1000 MG: 1 INJECTION, POWDER, LYOPHILIZED, FOR SOLUTION INTRAMUSCULAR; INTRAVENOUS at 01:11

## 2017-01-01 RX ADMIN — CALCIUM ACETATE 1334 MG: 667 CAPSULE ORAL at 14:24

## 2017-01-01 RX ADMIN — CALCIUM ACETATE 1334 MG: 667 CAPSULE ORAL at 17:54

## 2017-01-01 RX ADMIN — HEPARIN SODIUM 5000 UNITS: 5000 INJECTION, SOLUTION INTRAVENOUS; SUBCUTANEOUS at 21:23

## 2017-01-01 RX ADMIN — STANDARDIZED SENNA CONCENTRATE AND DOCUSATE SODIUM 1 TABLET: 8.6; 5 TABLET, FILM COATED ORAL at 09:27

## 2017-01-01 RX ADMIN — HEPARIN SODIUM 5000 UNITS: 5000 INJECTION, SOLUTION INTRAVENOUS; SUBCUTANEOUS at 13:08

## 2017-01-01 RX ADMIN — SODIUM PHOSPHATE, MONOBASIC, MONOHYDRATE 30 MMOL: 276; 142 INJECTION, SOLUTION INTRAVENOUS at 11:08

## 2017-01-01 RX ADMIN — ISODIUM CHLORIDE 3 ML: 0.03 SOLUTION RESPIRATORY (INHALATION) at 18:34

## 2017-01-01 RX ADMIN — CALCIUM ACETATE 1334 MG: 667 CAPSULE ORAL at 13:08

## 2017-01-01 RX ADMIN — IPRATROPIUM BROMIDE 0.5 MG: 0.5 SOLUTION RESPIRATORY (INHALATION) at 13:07

## 2017-01-01 RX ADMIN — MIRTAZAPINE 15 MG: 15 TABLET, FILM COATED ORAL at 21:02

## 2017-01-01 RX ADMIN — METHYLPREDNISOLONE SODIUM SUCCINATE 125 MG: 125 INJECTION, POWDER, FOR SOLUTION INTRAMUSCULAR; INTRAVENOUS at 00:29

## 2017-01-01 RX ADMIN — CEFTRIAXONE 2000 MG: 2 INJECTION, POWDER, FOR SOLUTION INTRAMUSCULAR; INTRAVENOUS at 18:13

## 2017-01-01 RX ADMIN — METHYLPREDNISOLONE SODIUM SUCCINATE 125 MG: 125 INJECTION, POWDER, FOR SOLUTION INTRAMUSCULAR; INTRAVENOUS at 13:12

## 2017-01-01 RX ADMIN — MORPHINE SULFATE 4 MG: 4 INJECTION INTRAVENOUS at 05:41

## 2017-01-01 RX ADMIN — MORPHINE SULFATE 2 MG: 2 INJECTION, SOLUTION INTRAMUSCULAR; INTRAVENOUS at 08:38

## 2017-01-01 RX ADMIN — Medication 1 TABLET: at 09:27

## 2017-01-01 RX ADMIN — CEFEPIME 2000 MG: 2 INJECTION, POWDER, FOR SOLUTION INTRAMUSCULAR; INTRAVENOUS at 17:26

## 2017-01-01 RX ADMIN — METHYLPREDNISOLONE SODIUM SUCCINATE 60 MG: 125 INJECTION, POWDER, FOR SOLUTION INTRAMUSCULAR; INTRAVENOUS at 11:08

## 2017-01-01 RX ADMIN — LEVOTHYROXINE SODIUM 75 MCG: 75 TABLET ORAL at 09:14

## 2017-01-01 RX ADMIN — AZTREONAM 1000 MG: 1 INJECTION, POWDER, LYOPHILIZED, FOR SOLUTION INTRAMUSCULAR; INTRAVENOUS at 17:15

## 2017-01-01 RX ADMIN — MINERAL OIL AND WHITE PETROLATUM 1 APPLICATION: 150; 830 OINTMENT OPHTHALMIC at 09:53

## 2017-01-01 RX ADMIN — MINERAL OIL AND WHITE PETROLATUM: 150; 830 OINTMENT OPHTHALMIC at 17:00

## 2017-01-01 RX ADMIN — LEVALBUTEROL HYDROCHLORIDE 1.25 MG: 1.25 SOLUTION, CONCENTRATE RESPIRATORY (INHALATION) at 15:12

## 2017-01-01 RX ADMIN — GUAIFENESIN 600 MG: 600 TABLET, EXTENDED RELEASE ORAL at 21:02

## 2017-01-01 RX ADMIN — MORPHINE SULFATE 4 MG: 4 INJECTION INTRAVENOUS at 15:24

## 2017-01-01 RX ADMIN — VANCOMYCIN HYDROCHLORIDE 1000 MG: 1 INJECTION, SOLUTION INTRAVENOUS at 02:05

## 2017-01-01 RX ADMIN — LEVALBUTEROL HYDROCHLORIDE 1.25 MG: 1.25 SOLUTION, CONCENTRATE RESPIRATORY (INHALATION) at 18:33

## 2017-01-01 RX ADMIN — LEVALBUTEROL HYDROCHLORIDE 1.25 MG: 1.25 SOLUTION, CONCENTRATE RESPIRATORY (INHALATION) at 07:57

## 2017-01-01 RX ADMIN — CALCIUM ACETATE 1334 MG: 667 CAPSULE ORAL at 17:38

## 2017-01-01 RX ADMIN — MINERAL OIL AND WHITE PETROLATUM: 150; 830 OINTMENT OPHTHALMIC at 07:54

## 2017-01-01 RX ADMIN — MORPHINE SULFATE 2 MG: 2 INJECTION, SOLUTION INTRAMUSCULAR; INTRAVENOUS at 11:42

## 2017-01-01 RX ADMIN — ISODIUM CHLORIDE 3 ML: 0.03 SOLUTION RESPIRATORY (INHALATION) at 20:20

## 2017-01-01 RX ADMIN — LEVALBUTEROL HYDROCHLORIDE 1.25 MG: 1.25 SOLUTION, CONCENTRATE RESPIRATORY (INHALATION) at 13:10

## 2017-01-01 RX ADMIN — LEVALBUTEROL 1.25 MG: 1.25 SOLUTION, CONCENTRATE RESPIRATORY (INHALATION) at 07:52

## 2017-01-01 RX ADMIN — HEPARIN SODIUM 5000 UNITS: 5000 INJECTION, SOLUTION INTRAVENOUS; SUBCUTANEOUS at 13:46

## 2017-01-01 RX ADMIN — PAROXETINE HYDROCHLORIDE 10 MG: 20 TABLET, FILM COATED ORAL at 09:14

## 2017-01-01 RX ADMIN — BISACODYL 10 MG: 10 SUPPOSITORY RECTAL at 13:46

## 2017-01-01 RX ADMIN — METHYLPREDNISOLONE SODIUM SUCCINATE 40 MG: 40 INJECTION, POWDER, FOR SOLUTION INTRAMUSCULAR; INTRAVENOUS at 05:39

## 2017-01-01 RX ADMIN — IPRATROPIUM BROMIDE 0.5 MG: 0.5 SOLUTION RESPIRATORY (INHALATION) at 13:42

## 2017-01-01 RX ADMIN — IPRATROPIUM BROMIDE 0.5 MG: 0.5 SOLUTION RESPIRATORY (INHALATION) at 07:52

## 2017-01-01 RX ADMIN — Medication 1 TABLET: at 08:36

## 2017-01-01 RX ADMIN — ALBUTEROL SULFATE 10 MG: 2.5 SOLUTION RESPIRATORY (INHALATION) at 23:42

## 2017-01-01 RX ADMIN — HEPARIN SODIUM 5000 UNITS: 5000 INJECTION, SOLUTION INTRAVENOUS; SUBCUTANEOUS at 14:24

## 2017-01-01 RX ADMIN — AZITHROMYCIN MONOHYDRATE 500 MG: 500 INJECTION, POWDER, LYOPHILIZED, FOR SOLUTION INTRAVENOUS at 19:00

## 2017-01-01 RX ADMIN — CEFDINIR 300 MG: 300 CAPSULE ORAL at 09:15

## 2017-01-01 RX ADMIN — LEVALBUTEROL 1.25 MG: 1.25 SOLUTION, CONCENTRATE RESPIRATORY (INHALATION) at 01:15

## 2017-01-01 RX ADMIN — SODIUM CHLORIDE 75 ML/HR: 0.9 INJECTION, SOLUTION INTRAVENOUS at 06:36

## 2017-01-01 RX ADMIN — SODIUM CHLORIDE 1000 ML: 0.9 INJECTION, SOLUTION INTRAVENOUS at 18:45

## 2017-01-01 RX ADMIN — GUAIFENESIN 600 MG: 600 TABLET, EXTENDED RELEASE ORAL at 08:36

## 2017-01-01 RX ADMIN — CEFEPIME HYDROCHLORIDE 2000 MG: 2 INJECTION, POWDER, FOR SOLUTION INTRAVENOUS at 07:44

## 2017-01-01 RX ADMIN — DIPHENHYDRAMINE HYDROCHLORIDE 50 MG: 50 INJECTION, SOLUTION INTRAMUSCULAR; INTRAVENOUS at 17:15

## 2017-01-01 RX ADMIN — GUAIFENESIN 600 MG: 600 TABLET, EXTENDED RELEASE ORAL at 09:27

## 2017-01-01 RX ADMIN — HALOPERIDOL LACTATE 2 MG: 5 INJECTION, SOLUTION INTRAMUSCULAR at 21:49

## 2017-01-01 RX ADMIN — LEVALBUTEROL 1.25 MG: 1.25 SOLUTION, CONCENTRATE RESPIRATORY (INHALATION) at 21:34

## 2017-01-01 RX ADMIN — CALCIUM ACETATE 1334 MG: 667 CAPSULE ORAL at 09:03

## 2017-01-01 RX ADMIN — HEPARIN SODIUM 5000 UNITS: 5000 INJECTION, SOLUTION INTRAVENOUS; SUBCUTANEOUS at 05:18

## 2017-01-01 RX ADMIN — STANDARDIZED SENNA CONCENTRATE AND DOCUSATE SODIUM 1 TABLET: 8.6; 5 TABLET, FILM COATED ORAL at 17:54

## 2017-01-01 RX ADMIN — MINERAL OIL AND WHITE PETROLATUM: 150; 830 OINTMENT OPHTHALMIC at 08:26

## 2017-01-01 RX ADMIN — SODIUM CHLORIDE 500 ML: 0.9 INJECTION, SOLUTION INTRAVENOUS at 08:15

## 2017-01-01 RX ADMIN — MINERAL OIL AND WHITE PETROLATUM: 150; 830 OINTMENT OPHTHALMIC at 08:39

## 2017-01-01 RX ADMIN — AZTREONAM 1000 MG: 1 INJECTION, POWDER, LYOPHILIZED, FOR SOLUTION INTRAMUSCULAR; INTRAVENOUS at 08:26

## 2017-01-01 RX ADMIN — IPRATROPIUM BROMIDE 0.5 MG: 0.5 SOLUTION RESPIRATORY (INHALATION) at 00:31

## 2017-01-01 RX ADMIN — MORPHINE SULFATE 1 MG: 2 INJECTION, SOLUTION INTRAMUSCULAR; INTRAVENOUS at 09:31

## 2017-01-01 RX ADMIN — LEVOTHYROXINE SODIUM 75 MCG: 75 TABLET ORAL at 05:43

## 2017-01-01 RX ADMIN — MORPHINE SULFATE 5 MG: 100 SOLUTION ORAL at 16:11

## 2017-01-01 RX ADMIN — METRONIDAZOLE 500 MG: 500 INJECTION, SOLUTION INTRAVENOUS at 21:53

## 2017-01-01 RX ADMIN — LEVALBUTEROL HYDROCHLORIDE 1.25 MG: 1.25 SOLUTION, CONCENTRATE RESPIRATORY (INHALATION) at 20:20

## 2017-01-01 RX ADMIN — POLYETHYLENE GLYCOL 3350 17 G: 17 POWDER, FOR SOLUTION ORAL at 09:14

## 2017-01-01 RX ADMIN — METRONIDAZOLE 500 MG: 500 INJECTION, SOLUTION INTRAVENOUS at 21:55

## 2017-01-01 RX ADMIN — MINERAL OIL AND WHITE PETROLATUM: 150; 830 OINTMENT OPHTHALMIC at 17:01

## 2017-01-01 RX ADMIN — IPRATROPIUM BROMIDE 0.5 MG: 0.5 SOLUTION RESPIRATORY (INHALATION) at 13:22

## 2017-01-01 RX ADMIN — MORPHINE SULFATE 5 MG: 100 SOLUTION ORAL at 06:44

## 2017-01-01 RX ADMIN — FAMOTIDINE 20 MG: 10 INJECTION, SOLUTION INTRAVENOUS at 07:54

## 2017-01-01 RX ADMIN — HYDRALAZINE HYDROCHLORIDE 5 MG: 20 INJECTION INTRAMUSCULAR; INTRAVENOUS at 11:32

## 2017-01-01 RX ADMIN — SODIUM CHLORIDE 500 ML: 0.9 INJECTION, SOLUTION INTRAVENOUS at 09:20

## 2017-01-01 RX ADMIN — LEVOTHYROXINE SODIUM 75 MCG: 75 TABLET ORAL at 07:31

## 2017-01-01 RX ADMIN — MORPHINE SULFATE 5 MG: 100 SOLUTION ORAL at 15:27

## 2017-01-01 RX ADMIN — CEFEPIME HYDROCHLORIDE 1000 MG: 1 INJECTION, POWDER, FOR SOLUTION INTRAMUSCULAR; INTRAVENOUS at 04:07

## 2017-01-01 RX ADMIN — BISACODYL 10 MG: 10 SUPPOSITORY RECTAL at 08:36

## 2017-01-01 RX ADMIN — MORPHINE SULFATE 5 MG: 100 SOLUTION ORAL at 19:15

## 2017-01-01 RX ADMIN — MORPHINE SULFATE 5 MG: 100 SOLUTION ORAL at 03:57

## 2017-01-01 RX ADMIN — STANDARDIZED SENNA CONCENTRATE AND DOCUSATE SODIUM 1 TABLET: 8.6; 5 TABLET, FILM COATED ORAL at 13:49

## 2017-01-01 RX ADMIN — GUAIFENESIN 600 MG: 600 TABLET, EXTENDED RELEASE ORAL at 21:33

## 2017-01-01 RX ADMIN — LEVALBUTEROL 1.25 MG: 1.25 SOLUTION, CONCENTRATE RESPIRATORY (INHALATION) at 19:25

## 2017-01-01 RX ADMIN — GUAIFENESIN 600 MG: 600 TABLET, EXTENDED RELEASE ORAL at 09:04

## 2017-01-01 RX ADMIN — LACTULOSE 10 G: 20 SOLUTION ORAL at 09:04

## 2017-01-01 RX ADMIN — Medication 1 TABLET: at 09:03

## 2017-01-01 RX ADMIN — LEVALBUTEROL HYDROCHLORIDE 1.25 MG: 1.25 SOLUTION, CONCENTRATE RESPIRATORY (INHALATION) at 07:14

## 2017-01-01 RX ADMIN — DIPHENHYDRAMINE HYDROCHLORIDE 12.5 MG: 50 INJECTION, SOLUTION INTRAMUSCULAR; INTRAVENOUS at 19:57

## 2017-01-01 RX ADMIN — POLYETHYLENE GLYCOL 3350 17 G: 17 POWDER, FOR SOLUTION ORAL at 13:46

## 2017-01-01 RX ADMIN — MORPHINE SULFATE 2 MG: 2 INJECTION, SOLUTION INTRAMUSCULAR; INTRAVENOUS at 12:57

## 2017-01-01 RX ADMIN — IPRATROPIUM BROMIDE 0.5 MG: 0.5 SOLUTION RESPIRATORY (INHALATION) at 19:04

## 2017-01-01 RX ADMIN — SODIUM CHLORIDE 75 ML/HR: 0.9 INJECTION, SOLUTION INTRAVENOUS at 21:54

## 2017-01-01 RX ADMIN — ALBUMIN HUMAN 250 ML: 0.05 INJECTION, SOLUTION INTRAVENOUS at 05:16

## 2017-01-01 RX ADMIN — LEVALBUTEROL HYDROCHLORIDE 1.25 MG: 1.25 SOLUTION, CONCENTRATE RESPIRATORY (INHALATION) at 19:04

## 2017-01-01 RX ADMIN — IPRATROPIUM BROMIDE 0.5 MG: 0.5 SOLUTION RESPIRATORY (INHALATION) at 08:44

## 2017-01-01 RX ADMIN — ACETAMINOPHEN 650 MG: 160 SUSPENSION ORAL at 14:33

## 2017-01-01 RX ADMIN — MORPHINE SULFATE 2 MG: 2 INJECTION, SOLUTION INTRAMUSCULAR; INTRAVENOUS at 04:10

## 2017-01-01 RX ADMIN — POLYETHYLENE GLYCOL 3350 17 G: 17 POWDER, FOR SOLUTION ORAL at 09:27

## 2017-01-01 RX ADMIN — PAROXETINE HYDROCHLORIDE 10 MG: 20 TABLET, FILM COATED ORAL at 09:27

## 2017-01-01 RX ADMIN — LEVOTHYROXINE SODIUM 75 MCG: 75 TABLET ORAL at 05:18

## 2017-01-01 RX ADMIN — METHYLPREDNISOLONE SODIUM SUCCINATE 125 MG: 125 INJECTION, POWDER, FOR SOLUTION INTRAMUSCULAR; INTRAVENOUS at 06:35

## 2017-01-01 RX ADMIN — METHYLPREDNISOLONE SODIUM SUCCINATE 40 MG: 40 INJECTION, POWDER, FOR SOLUTION INTRAMUSCULAR; INTRAVENOUS at 21:29

## 2017-01-01 RX ADMIN — VANCOMYCIN HYDROCHLORIDE 750 MG: 750 INJECTION, SOLUTION INTRAVENOUS at 06:35

## 2017-01-01 RX ADMIN — MORPHINE SULFATE 5 MG: 100 SOLUTION ORAL at 02:47

## 2017-01-01 RX ADMIN — LEVALBUTEROL HYDROCHLORIDE 1.25 MG: 1.25 SOLUTION, CONCENTRATE RESPIRATORY (INHALATION) at 19:39

## 2017-01-01 RX ADMIN — CEFEPIME HYDROCHLORIDE 1000 MG: 1 INJECTION, POWDER, FOR SOLUTION INTRAMUSCULAR; INTRAVENOUS at 00:50

## 2017-07-28 PROBLEM — A41.9 SEVERE SEPSIS (HCC): Status: ACTIVE | Noted: 2017-01-01

## 2017-07-28 PROBLEM — R65.20 SEVERE SEPSIS (HCC): Status: ACTIVE | Noted: 2017-01-01

## 2017-07-28 PROBLEM — R41.82 ALTERED MENTAL STATUS: Status: ACTIVE | Noted: 2017-01-01

## 2017-07-28 PROBLEM — J18.9 PNEUMONIA: Status: ACTIVE | Noted: 2017-01-01

## 2017-07-28 PROBLEM — J96.01 ACUTE RESPIRATORY FAILURE WITH HYPOXIA (HCC): Status: ACTIVE | Noted: 2017-01-01

## 2017-07-29 PROBLEM — G93.41 ACUTE METABOLIC ENCEPHALOPATHY: Status: ACTIVE | Noted: 2017-01-01

## 2017-07-31 PROBLEM — R26.2 AMBULATORY DYSFUNCTION: Status: ACTIVE | Noted: 2017-01-01

## 2017-07-31 PROBLEM — G93.41 ACUTE METABOLIC ENCEPHALOPATHY: Status: RESOLVED | Noted: 2017-01-01 | Resolved: 2017-01-01

## 2017-08-05 PROBLEM — R06.03 RESPIRATORY DISTRESS: Status: ACTIVE | Noted: 2017-01-01

## 2017-08-05 PROBLEM — E03.9 HYPOTHYROIDISM: Status: ACTIVE | Noted: 2017-01-01

## 2017-08-05 PROBLEM — F03.90 DEMENTIA (HCC): Status: ACTIVE | Noted: 2017-01-01

## 2017-11-20 PROBLEM — R26.2 AMBULATORY DYSFUNCTION: Chronic | Status: ACTIVE | Noted: 2017-01-01

## 2017-11-20 PROBLEM — E03.9 HYPOTHYROIDISM: Chronic | Status: ACTIVE | Noted: 2017-01-01

## 2017-11-20 PROBLEM — G30.9 ALZHEIMER'S DEMENTIA WITH BEHAVIORAL DISTURBANCE (HCC): Status: ACTIVE | Noted: 2017-01-01

## 2017-11-20 PROBLEM — F02.81 ALZHEIMER'S DEMENTIA WITH BEHAVIORAL DISTURBANCE (HCC): Chronic | Status: ACTIVE | Noted: 2017-01-01

## 2017-11-20 PROBLEM — F02.81 ALZHEIMER'S DEMENTIA WITH BEHAVIORAL DISTURBANCE (HCC): Status: ACTIVE | Noted: 2017-01-01

## 2017-11-20 PROBLEM — G30.9 ALZHEIMER'S DEMENTIA WITH BEHAVIORAL DISTURBANCE (HCC): Chronic | Status: ACTIVE | Noted: 2017-01-01

## 2017-11-20 PROBLEM — N17.9 AKI (ACUTE KIDNEY INJURY) (HCC): Status: ACTIVE | Noted: 2017-01-01

## 2017-11-20 PROBLEM — G93.40 ACUTE ENCEPHALOPATHY: Status: ACTIVE | Noted: 2017-01-01

## 2017-11-20 NOTE — ED NOTES
Changed pt gown, linens, and provided fresh blankets  Xray at bedside        Franklin Cantu  11/20/17 0575

## 2017-11-20 NOTE — SEPSIS NOTE
Sepsis Note   Ayala Arias 80 y o  female MRN: [id number]  Unit/Bed#: ED 24 Encounter: [id number]            Initial Sepsis Screening     [address] Name 11/20/17 1659                Is the patient's history suggestive of a new or worsening infection? (!)  Yes (Proceed)  -MT        Suspected source of infection pneumonia  -MT        Are two or more of the following signs & symptoms of infection both present and new to the patient?         Indicate SIRS criteria Tachypnea > 20 resp per min; Tachycardia > 90 bpm  -MT        If the answer is yes to both questions, suspicion of sepsis is present          If severe sepsis is present AND tissue hypoperfusion perists in the hour after fluid resuscitation or lactate > 4, the patient meets criteria for SEPTIC SHOCK          Are any of the following organ dysfunction criteria present within 6 hours of suspected infection and SIRS criteria that are NOT considered to be chronic conditions? (!)  Yes  -MT        Organ dysfunction Acute respiratory failure (new need for invasive or non-invasive mechanical ventilation); Lactate > 2 0 mmol/L  -MT        Date of presentation of severe sepsis          Time of presentation of severe sepsis 1826  -MT        Tissue hypoperfusion persists in the hour after crystalloid fluid administration, evidenced, by either:          Was hypotension present within one hour of the conclusion of crystalloid fluid administration?           Date of presentation of septic shock          Time of presentation of septic shock            User Key  (r) = Recorded By, (t) = Taken By, (c) = Cosigned By    Initials Name Provider Type    [address] [place] [address] Physician

## 2017-11-20 NOTE — ED PROVIDER NOTES
History  Chief Complaint   Patient presents with    Vomiting     Pt presents from [place] with vomiting and chills  Pt was just treated for UTI  Pt does not have a fever  Sent in from nursing home for vomiting x2  P soon was recently in the hospital and treated for UTI  Patient also has a junky sounding cough  Patient is afebrile on arrival         History provided by:  Nursing home and EMS personnel  History limited by:  Dementia   used: No    Vomiting   Severity:  Mild  Duration:  1 hour  Quality:  Stomach contents  Progression:  Unchanged  Chronicity:  New  Recent urination:  Normal  Ineffective treatments:  None tried  Associated symptoms: chills    Associated symptoms: no abdominal pain, no arthralgias, no cough, no diarrhea, no fever, no headaches, no sore throat and no URI    Risk factors: sick contacts    Risk factors: no alcohol use        Prior to Admission Medications   Prescriptions Last Dose Informant Patient Reported? Taking?    Multiple Vitamins-Minerals (PRESERVISION AREDS 2) CAPS   Yes Yes   Sig: Take by mouth   Sodium Phosphates (ENEMA DISPOSABLE RE)   Yes Yes   Sig: Insert into the rectum daily as needed   acetaminophen (TYLENOL) 325 mg tablet   Yes Yes   Sig: Take 650 mg by mouth every 4 (four) hours as needed for mild pain or fever (>100 0)     artificial tear (LUBRIFRESH P M ) 83-15 % ophthalmic ointment   Yes Yes   Si (two) times a day     bisacodyl (BISAC-EVAC) 10 mg suppository   Yes Yes   Sig: Insert 10 mg into the rectum daily   dextromethorphan-guaiFENesin (ROBITUSSIN DM)  mg/5 mL syrup   No Yes   Sig: Take 10 mL by mouth every 4 (four) hours as needed for cough   ipratropium-albuterol (DUO-NEB) 0 5-2 5 mg/mL   Yes Yes   Sig: Take 3 mL by nebulization every 4 (four) hours while awake   magnesium hydroxide (MILK OF MAGNESIA) 400 mg/5 mL oral suspension   Yes Yes   Sig: Take by mouth daily at bedtime   morphine sulfate, concentrate, 100 MG/5ML concentrated solution   Yes Yes   Sig: Take 10 mg by mouth every hour as needed for severe pain   nitrofurantoin (MACROBID) 100 mg capsule   Yes Yes   Sig: Take 100 mg by mouth 2 (two) times a day      Facility-Administered Medications: None       Past Medical History:   Diagnosis Date    COPD (chronic obstructive pulmonary disease) ([facility] [address] )     Dementia 8/5/2017    Depression     Disease of thyroid gland     Hypothyroidism 8/5/2017    Osteoarthritis     Osteoporosis        History reviewed  No pertinent surgical history  History reviewed  No pertinent family history  I have reviewed and agree with the history as documented  Social History   Substance Use Topics    Smoking status: Unknown If Ever Smoked    Smokeless tobacco: Not on file    Alcohol use No        Review of Systems   Constitutional: Positive for chills  Negative for fatigue and fever  HENT: Negative for congestion, ear pain and sore throat  Eyes: Negative for discharge and redness  Respiratory: Negative for apnea, cough, shortness of breath and wheezing  Cardiovascular: Negative for chest pain  Gastrointestinal: Positive for vomiting  Negative for abdominal pain and diarrhea  Endocrine: Negative for cold intolerance and polydipsia  Genitourinary: Negative for difficulty urinating and hematuria  Musculoskeletal: Negative for arthralgias and back pain  Skin: Negative for color change and rash  Allergic/Immunologic: Negative for environmental allergies and immunocompromised state  Neurological: Negative for numbness and headaches  Hematological: Negative for adenopathy  Does not bruise/bleed easily  Psychiatric/Behavioral: Negative for agitation and behavioral problems         Physical Exam  ED Triage Vitals   Temperature Pulse Respirations Blood Pressure SpO2   11/20/17 1539 11/20/17 1541 11/20/17 1539 11/20/17 1539 11/20/17 1541   97 8 °F (36 6 °C) 101 20 (!) 198/79 96 %      Temp Source Heart Rate Source Patient Position - Orthostatic VS BP Location FiO2 (%)   11/20/17 1539 11/20/17 1539 11/20/17 1539 11/20/17 1539 11/20/17 1840   Oral Monitor Lying Right arm 60      Pain Score       --                  Orthostatic Vital Signs  Vitals:    11/20/17 1645 11/20/17 1815 11/20/17 1845 11/20/17 1900   BP: 149/63 156/70  144/63   Pulse: 104 96 86 88   Patient Position - Orthostatic VS: Sitting Lying  Lying       Physical Exam   Constitutional: Vital signs are normal  She appears cachectic  Non-toxic appearance  She has a sickly appearance  She appears ill  She appears distressed  HENT:   Head: Normocephalic and atraumatic  Right Ear: Tympanic membrane and external ear normal    Left Ear: Tympanic membrane and external ear normal    Nose: Nose normal  No rhinorrhea, sinus tenderness or nasal deformity  Mouth/Throat: Uvula is midline and oropharynx is clear and moist  Normal dentition  Eyes: Conjunctivae, EOM and lids are normal  Pupils are equal, round, and reactive to light  Right eye exhibits no discharge  Left eye exhibits no discharge  Neck: Trachea normal and normal range of motion  Neck supple  No JVD present  Carotid bruit is not present  Cardiovascular: Regular rhythm, intact distal pulses and normal pulses  No extrasystoles are present  Tachycardia present  PMI is not displaced  Pulmonary/Chest: Effort normal  No accessory muscle usage  No respiratory distress  She has decreased breath sounds  She has no wheezes  She has no rhonchi  She has no rales  Abdominal: Soft  Normal appearance and bowel sounds are normal  She exhibits no mass  There is no tenderness  There is no rigidity, no rebound and no guarding  Musculoskeletal:        Right shoulder: She exhibits normal range of motion, no bony tenderness, no swelling and no deformity  Cervical back: Normal  She exhibits normal range of motion, no tenderness, no bony tenderness and no deformity     Lymphadenopathy:     She has no cervical adenopathy  She has no axillary adenopathy  Neurological: She is alert  She has normal strength and normal reflexes  She is disoriented  No cranial nerve deficit or sensory deficit  GCS eye subscore is 4  GCS verbal subscore is 5  GCS motor subscore is 6  Skin: Skin is warm and dry  No rash noted  Psychiatric: Cognition and memory are impaired  Nursing note and vitals reviewed  ED Medications  Medications   sodium chloride (PF) 0 9 % injection 3 mL (not administered)   sodium chloride 0 9 % bolus 1,000 mL (1,000 mL Intravenous New Bag 11/20/17 1830)     Followed by   sodium chloride 0 9 % bolus 1,000 mL (1,000 mL Intravenous New Bag 11/20/17 1845)   ondansetron (ZOFRAN) injection 4 mg (4 mg Intravenous Given 11/20/17 1621)   cefepime (MAXIPIME) 2 g/50 mL dextrose IVPB (0 mg Intravenous Stopped 11/20/17 1800)   levalbuterol (XOPENEX) inhalation solution 1 25 mg (1 25 mg Nebulization Given by Other 11/20/17 1833)   sodium chloride 0 9 % inhalation solution **AcuDose Override Pull** (3 mL  Given by Other 11/20/17 1834)       Diagnostic Studies  Results Reviewed     Procedure Component Value Units Date/Time    Lactic acid x2 Q2H [[id number]] Collected:  11/20/17 1935    Lab Status:  No result Specimen:  Blood from Arm, Right     Urine Microscopic [[id number]]  (Abnormal) Collected:  11/20/17 1730    Lab Status:  Final result Specimen:  Urine from Urine, Straight Cath Updated:  11/20/17 1858     RBC, UA 0-1 (A) /hpf      WBC, UA 30-50 (A) /hpf      Epithelial Cells Occasional /hpf      Bacteria, UA Occasional /hpf      Uric Acid Criselda, UA Occasional /hpf     Urine culture [[id number]] Collected:  11/20/17 1730    Lab Status: In process Specimen:  Urine from Urine, Straight Cath Updated:  11/20/17 1858    Protime-INR [[id number]] Collected:  11/20/17 1826    Lab Status: In process Specimen:  Blood from Arm, Left Updated:  11/20/17 1842    APTT [[id number]] Collected:  11/20/17 1826    Lab Status:   In process Specimen:  Blood from Arm, Left Updated:  11/20/17 1842    Lactic acid x2 Q2H [[id number]]  (Abnormal) Collected:  11/20/17 1736    Lab Status:  Final result Specimen:  Blood from Arm, Right Updated:  11/20/17 1817     LACTIC ACID 2 7 (HH) mmol/L     Narrative:         Result may be elevated if tourniquet was used during collection  UA w Reflex to Microscopic w Reflex to Culture [[id number]]  (Abnormal) Collected:  11/20/17 1730    Lab Status:  Final result Specimen:  Urine from Urine, Straight Cath Updated:  11/20/17 1736     Color, UA Yellow     Clarity, UA Slightly Cloudy     Specific Gravity, UA 1 025     pH, UA 6 0     Leukocytes, UA Moderate (A)     Nitrite, UA Positive (A)     Protein, UA Trace (A) mg/dl      Glucose, UA Negative mg/dl      Ketones, UA Trace (A) mg/dl      Urobilinogen, UA 0 2 E U /dl      Bilirubin, UA Negative     Blood, UA Small (A)    Blood culture #2 [[id number]] Collected:  11/20/17 1723    Lab Status: In process Specimen:  Blood from Arm, Right Updated:  11/20/17 1734    Blood culture #1 [[id number]] Collected:  11/20/17 1724    Lab Status: In process Specimen:  Blood from Hand, Right Updated:  11/20/17 1734    Troponin I [[id number]]  (Normal) Collected:  11/20/17 1644    Lab Status:  Final result Specimen:  Blood from Arm, Left Updated:  11/20/17 1717     Troponin I <0 02 ng/mL     Narrative:         [employer] Chemistry analyzer 99% cutoff is > 0 04 ng/mL in network labs    o cTnI 99% cutoff is useful only when applied to patients in the clinical setting of myocardial ischemia  o cTnI 99% cutoff should be interpreted in the context of clinical history, ECG findings and possibly cardiac imaging to establish correct diagnosis  o cTnI 99% cutoff may be suggestive but clearly not indicative of a coronary event without the clinical setting of myocardial ischemia      B-type natriuretic peptide [[id number]]  (Abnormal) Collected:  11/20/17 1602    Lab Status:  Final result Specimen:  Blood from Arm, Left Updated:  11/20/17 1714     NT-proBNP 579 (H) pg/mL     CBC and differential [[id number]]  (Abnormal) Collected:  11/20/17 1602    Lab Status:  Final result Specimen:  Blood from Arm, Left Updated:  11/20/17 1713     WBC 13 91 (H) Thousand/uL      RBC 4 92 Million/uL      Hemoglobin 15 2 g/dL      Hematocrit 47 9 (H) %      MCV 97 fL      MCH 30 9 pg      MCHC 31 7 g/dL      RDW 16 5 (H) %      MPV 10 9 fL      Platelets 427 Thousands/uL     Narrative: This is an appended report  These results have been appended to a previously verified report  CMP [[id number]]  (Abnormal) Collected:  11/20/17 1602    Lab Status:  Final result Specimen:  Blood from Arm, Left Updated:  11/20/17 1632     Sodium 141 mmol/L      Potassium 4 1 mmol/L      Chloride 105 mmol/L      CO2 22 mmol/L      Anion Gap 14 (H) mmol/L      BUN 23 mg/dL      Creatinine 1 36 (H) mg/dL      Glucose 115 mg/dL      Calcium 10 2 (H) mg/dL      AST 52 (H) U/L      ALT 33 U/L      Alkaline Phosphatase 197 (H) U/L      Total Protein 8 3 (H) g/dL      Albumin 3 5 g/dL      Total Bilirubin 0 60 mg/dL      eGFR 34 ml/min/1 73sq m     Narrative:         National Kidney Disease Education Program recommendations are as follows:  GFR calculation is accurate only with a steady state creatinine  Chronic Kidney disease less than 60 ml/min/1 73 sq  meters  Kidney failure less than 15 ml/min/1 73 sq  meters  Lipase [[id number]]  (Normal) Collected:  11/20/17 1602    Lab Status:  Final result Specimen:  Blood from Arm, Left Updated:  11/20/17 1632     Lipase 85 u/L                  CT chest abdomen pelvis wo contrast   Final Result by Su Galvez MD (11/20 1729)      Small patchy bibasilar airspace opacities suspicious for bronchopneumonia  Diffuse bilateral tree-in-bud opacities are also noted suggestive of an infectious or inflammatory bronchiolitis    A repeat CT chest in 6-8 weeks following treatment is    recommended to assess for improvement/resolution  Mild to moderate diffuse emphysematous lung changes  Stable pericardial calcification  Scattered colonic diverticulosis with no inflammatory changes present suggest acute diverticulitis  Moderate amount of stool noted within the rectum  Workstation performed: [id number]         XR chest 1 view portable   Final Result by kR Patel MD (11/20 3685)      Left lung base opacities reflecting atelectasis or pneumonia           Workstation performed: [id number]                    Procedures  ECG 12 Lead Documentation  Date/Time: 11/20/2017 4:44 PM  Performed by: Zeferino Alexandre  Authorized by: Rosemarie GRUBBS     Patient location:  ED  Rate:     ECG rate:  102  Rhythm:     Rhythm: sinus tachycardia      CriticalCare Time  Performed by: Zeferino Alexandre  Authorized by: Zeferino Alexandre     Critical care provider statement:     Critical care time (minutes):  35    Critical care time was exclusive of:  Separately billable procedures and treating other patients and teaching time    Critical care was necessary to treat or prevent imminent or life-threatening deterioration of the following conditions:  Sepsis and respiratory failure    Critical care was time spent personally by me on the following activities:  Blood draw for specimens, obtaining history from patient or surrogate, development of treatment plan with patient or surrogate, discussions with consultants, discussions with primary provider, evaluation of patient's response to treatment, examination of patient, review of old charts, re-evaluation of patient's condition, ordering and review of radiographic studies, ordering and review of laboratory studies and ordering and performing treatments and interventions           Phone Contacts  ED Phone Contact    ED Course  ED Course                          Initial Sepsis Screening     [address] Name 11/20/17 7130                Is the patient's history suggestive of a new or worsening infection? (!)  Yes (Proceed)  -MT        Suspected source of infection pneumonia  -MT        Are two or more of the following signs & symptoms of infection both present and new to the patient?         Indicate SIRS criteria Tachypnea > 20 resp per min; Tachycardia > 90 bpm  -MT        If the answer is yes to both questions, suspicion of sepsis is present          If severe sepsis is present AND tissue hypoperfusion perists in the hour after fluid resuscitation or lactate > 4, the patient meets criteria for SEPTIC SHOCK          Are any of the following organ dysfunction criteria present within 6 hours of suspected infection and SIRS criteria that are NOT considered to be chronic conditions? (!)  Yes  -MT        Organ dysfunction Acute respiratory failure (new need for invasive or non-invasive mechanical ventilation); Lactate > 2 0 mmol/L  -MT        Date of presentation of severe sepsis          Time of presentation of severe sepsis 1826  -MT        Tissue hypoperfusion persists in the hour after crystalloid fluid administration, evidenced, by either:          Was hypotension present within one hour of the conclusion of crystalloid fluid administration?           Date of presentation of septic shock          Time of presentation of septic shock            User Key  (r) = Recorded By, (t) = Taken By, (c) = Cosigned By    Initials Name Provider Type    MT Gretta Mayen DO Physician                  MDM  Number of Diagnoses or Management Options  Bilateral pneumonia: new and requires workup  Respiratory distress: new and requires workup  Severe sepsis [facility]): new and requires workup  UTI (urinary tract infection): new and requires workup     Amount and/or Complexity of Data Reviewed  Clinical lab tests: ordered and reviewed  Tests in the radiology section of CPT®: ordered and reviewed  Tests in the medicine section of CPT®: ordered and reviewed  Review and summarize past medical records: yes  Discuss the patient with other providers: yes  Independent visualization of images, tracings, or specimens: yes    Risk of Complications, Morbidity, and/or Mortality  General comments: 1830 hours p m  sepsis alert called discussed with critical care  ICU and step-down at this time are currently full they will come down evaluate her and we will consider what steps her next   respiratory is coming down place patient on BiPAP    Patient Progress  Patient progress: improved    CritCare Time    Disposition  Final diagnoses:   Bilateral pneumonia   UTI (urinary tract infection)   Severe sepsis (HCC)   Respiratory distress     Time reflects when diagnosis was documented in both MDM as applicable and the Disposition within this note     Time User Action Codes Description Comment    11/20/2017  7:15 PM Fany GRUBBS Add [J18 9] Bilateral pneumonia     11/20/2017  7:15 PM Lewis Sinha Add [N39 0] UTI (urinary tract infection)     11/20/2017  7:15 PM Lewis Sinha Add [A41 9,  R65 20] Severe sepsis ([address] )     11/20/2017  7:15 PM Ivonne Sinha Add [R06 00] Respiratory distress       ED Disposition     ED Disposition Condition Comment    Transfer to Another [address] [place] [address] should be transferred out to level 2 step down [facility] no step down or cc beds available at this facility      MD Documentation    Flowsheet Row Most Recent Value   Patient Condition  The patient has been stabilized such that within reasonable medical probability, no material deterioration of the patient condition or the condition of the unborn child(asaf) is likely to result from the transfer   Reason for Transfer  Level of Care needed not available at this facility   Benefits of Transfer  Specialized equipment and/or services available at the receiving facility (Include comment)________________________   Risks of Transfer  Potential for delay in receiving treatment   Accepting Physician  Dr Cosme Henning Name, [id number] [email or web address]     (Name & Tel number)  Judy Spence MD  [facility]   Provider Certification  General risk, such as traffic hazards, adverse weather conditions, rough terrain or turbulence, possible failure of equipment (including vehicle or aircraft), or consequences of actions of persons outside the control of the transport personnel      RN Documentation    [address] Name, [address] [employer]     (Name & Tel number)  Judy Patterson      Follow-up Information    None       Patient's Medications   Discharge Prescriptions    No medications on file     No discharge procedures on file      ED Provider  Electronically Signed by           Sabino Bang DO  11/20/17 2002

## 2017-11-20 NOTE — ED NOTES
Dr Edy Burdick notified of pts O2 sat remaining at 88% on 4L NC  Bipap order placed, Respiratory notified at this time for BIPAP application/neb start       Florence Narvaez RN  11/20/17 2017

## 2017-11-21 NOTE — CASE MANAGEMENT
Initial Clinical Review    Admission: Date/Time/Statement: 11/20/17 @ 2052     Orders Placed This Encounter   Procedures    Inpatient Admission     Standing Status:   Standing     Number of Occurrences:   1     Order Specific Question:   Admitting Physician     Answer:   Glenda Guidry     Order Specific Question:   Level of Care     Answer:   Level 2 Stepdown / HOT [14]     Order Specific Question:   Estimated length of stay     Answer:   More than 2 Midnights     Order Specific Question:   Certification     Answer:   I certify that inpatient services are medically necessary for this patient for a duration of greater than two midnights  See H&P and MD Progress Notes for additional information about the patient's course of treatment  11/20/2017 (5 hours)  [facility]   Bilateral pneumonia   UTI (urinary tract infection)    Severe sepsis (HCC)   Respiratory distress   TRANSFER TO [facility]  PRIOR TO ARRIVAL  :   IV ZOFRAN- IV CEFEPIME- IVF:  9% NS 2L BOLUS- LEVALBUTEROL X 1       History of Illness:    Nicholas Jones is a 80 y o  female , nursing home resident ,with past medical history of dementia ,COPD, hypothyroidism who was originally presented to [facility] ER with chills, cough and nonbloody vomitus X 2 and found to have O2 sat in high 80s on room air, tachycardia tachypnea with WBC 88412, LA 2 7 secondary to broncho- pneumonia on imaging study  Due to respiratory distress patient was placed on BiPAP and    accepted to step-down 2 level to [facility] ( no ICU beds at [facility])  After arrival patient found to be very agitated ,combative with staff and tried to pull out her IV ,she required 1 dose of Haldol after which patient became more cooperative and calm  She tolerated BiPAP well, O2 sat in high 90s    Given that patient is nursing home resident she was started on broad-spectrum antibiotic for HCAP and to cover possible aspiration          Temperature Pulse Respirations Blood Pressure SpO2   11/20/17 2139 11/20/17 2100 11/20/17 2100 11/20/17 2139 11/20/17 2100   98 3 °F (36 8 °C) 88 (!) 23 111/54 99 %      Temp Source Heart Rate Source Patient Position - Orthostatic VS BP Location FiO2 (%)   11/20/17 2139 11/20/17 2139 11/20/17 2139 11/20/17 2139 11/21/17 0000   Axillary Monitor Lying Right arm 60      Pain Score       11/21/17 0931       No Pain        Wt Readings from Last 1 Encounters:   11/20/17 57 1 kg (125 lb 14 1 oz)       Vital Signs (abnormal):      11/21/17 0844  --  --  --  --  100 %  --  --   SpO2: on flush due to rapid response at 11/21/17 0844 11/21/17 08:25:22  --   117   30   204/60  100 %  --  --   11/21/17 08:18:51  --   123  --  159/95  100 %  --  --   11/21/17 08:09:33  --   120  --   196/84  100 %  --  --   11/21/17 0752  99 6 °F (37 6 °C)  89  20  132/62  98 %  Other (comment)  --   11/21/17 0400  --  --  --  --  97 %  Other (comment)  --   O2 Device: BIPAP at 11/21/17 0400   11/21/17 0323  99 6 °F (37 6 °C)  90  22  124/73  95 %  --  --   11/21/17 0031  --  --  --  --  98 %  --  --   11/21/17 0000  98 °F (36 7 °C)  86  22   133/45  99 %  Other (comment)  --   O2 Device: BIPAP at 11/21/17 0000   11/20/17 2322  --  --  --  --  98 %  --  --   11/20/17 2141  --  --  --  --  99 %  Other (comment)  --   O2 Device: BIPAP at 11/20/17 2141 11/20/17 2139  98 3 °F (36 8 °C)  86   23  111/54  --  --  Lying           Abnormal Labs/Diagnostic Test Results:      WBC 4 31 - 10 16 Thousand/uL 13 91       LACTIC ACID 0 5 - 2 0 mmol/L 2 7       UA w Reflex to Microscopic w Reflex to Culture      Ref Range & Units 11/20/17 1730   Leukocytes, UA Negative  Moderate     Nitrite, UA Negative  Positive     Protein, UA Negative mg/dl Trace     Ketones, UA Negative mg/dl Trace     Bilirubin, UA Negative  Negative    Blood, UA Negative Small              Ref Range & Units 11/20/17 1730   RBC, UA None Seen, 0-5 /hpf 0-1     WBC, UA None Seen, 0-5, 5-55, 5-65 /hpf 30-50             Creatinine 0 60 - 1 30 mg/dL 1 36       Calcium 8 3 - 10 1 mg/dL 10 2       AST 5 - 45 U/L 52       Alkaline Phosphatase 46 - 116 U/L 197       LACTIC ACID 0 5 - 2 0 mmol/L 2 7           CT chest abdomen pelvis wo contrast  IMPRESSION:     Small patchy bibasilar airspace opacities suspicious for bronchopneumonia  Diffuse bilateral tree-in-bud opacities are also noted suggestive of an infectious or inflammatory bronchiolitis  A repeat CT chest in 6-8 weeks following treatment is   recommended to assess for improvement/resolution    Mild to moderate diffuse emphysematous lung changes    Stable pericardial calcification    Scattered colonic diverticulosis with no inflammatory changes present suggest acute diverticulitis    Moderate amount of stool noted within the rectum            Past Medical/Surgical History: Active Ambulatory Problems     Diagnosis Date Noted    [facility]     Severe sepsis ([facility] ) 07/28/2017    Acute respiratory failure with hypoxia ([facility] ) 07/28/2017    HCAP (healthcare-associated pneumonia) 07/28/2017    Ambulatory dysfunction 07/31/2017    Respiratory distress 08/05/2017    Hypothyroidism 08/05/2017    Dementia 08/05/2017     Resolved Ambulatory Problems     Diagnosis Date Noted    Acute metabolic encephalopathy 53/96/2373     Past Medical History:   Diagnosis Date    COPD [facility]chronic obstructive pulmonary disease) ([facility]     Dementia 8/5/2017    Depression     Disease of thyroid gland     Hypothyroidism 8/5/2017    Osteoarthritis     Osteoporosis        Admitting Diagnosis: PNA (pneumonia) [J18 9]    Age/Sex: 80 y o  female    Assessment/Plan    · Severe sepsis( HR>90, WBC 75979, RR >20, + acute encephalopathy ,lactic acid 2 7, BECKY) most likely secondary to bronchopneumonia  ? Will follow urine culture( patient was recently treated for UTI)    CT of abdomen and pelvis showed mild diverticulitis  ? Check influenza , Legionella and streptococcal urine antigen  ? Continue BiPAP as needed  ? Level of care should be clarified in a m , patient is DNR DNI currently ,during her previous admission she was level 4/  comfort care only, palliative care consult placed  ? ID evaluation  ? Continue current antibiotic with HCAP and aspiration coverage  ? Robitussin  ? History of dysphagia ,will keep patient NPO and proceed with   official speech and swallow evaluation in a m  gentle IV hydration  ? Repeat lactic acid after gentle hydration  ? Aspiration and fall precautions      Plan for Additional Problems:   · COPD, stable, continue nebs, respiratory protocol   · Dementia with behavioral disturbance aggravated by underlying infection process Haldol p r n  hold for lethargy frequent reorientation  · Hypothyroidism , check TSH  · Hypertension hydralazine p r n        Admission Orders:    TREND LACTIC ACID  URINE CULTURE  SPUTUM CULTURE  BLOOD CULTURES   LEGIONELLA  STREP PNEUMONIAE  INFLUENZA   BIPAP  PT AND OT EVAL AND TREAT  CONSULT INFECTIOUS DISEASE  CONSULT PALLIATIVE CARE  NPO SIPS  ASPIRATION PRECAUTIONS  DROPLET ISOLATION  SEQUENTIAL COMPRESSION DEVICE   SPEECH EVAL AND TREAT  NON VIOLENT RESTRAINT   FALL PRECAUTIONS       Scheduled Meds:   artificial tear  Both Eyes BID   aztreonam 1,000 mg Intravenous Q8H   diphenhydrAMINE 50 mg Intravenous Q6H   docusate sodium 100 mg Oral BID   enoxaparin 30 mg Subcutaneous Daily   [START ON 11/22/2017] famotidine 20 mg Intravenous Daily   ipratropium 0 5 mg Nebulization Q6H   levalbuterol 1 25 mg Nebulization Q6H   magnesium hydroxide 15 mL Oral HS   methylPREDNISolone sodium succinate 125 mg Intravenous Q6H [facility]   metroNIDAZOLE 500 mg Intravenous Q8H   senna 1 tablet Oral Daily   vancomycin 15 mg/kg Intravenous Q24H     Continuous Infusions:   sodium chloride 75 mL/hr Last Rate: 75 mL/hr (11/21/17 0920)     PRN Meds:   acetaminophen   dextromethorphan-guaiFENesin    haloperidol lactate    hydrALAZINE    ondansetron    sodium phosphate-biphosphate

## 2017-11-21 NOTE — CONSULTS
Consultation - Infectious Disease   Kike Mendez 80 y o  female MRN: [id number]  Unit/Bed#: [facility] 720-01 Encounter: [id number]      IMPRESSION & RECOMMENDATIONS:   Impression/Recommendations:  1  Severe sepsis  POA:  Leukocytosis, tachycardia, and lactic acidosis  Likely due to # 2  Consider also UTI given pyuria on UA  Blood cultures pending  CT A/P otherwise unremarkable  Hemodynamically stable  Rec:   · Continue vancomycin/aztreonam/Flagyl for now  · Follow-up blood and urine cultures from admission and tailor antibiotics as indicated  · Follow temperatures and white blood cell count closely  · Continue supportive care as per the primary service    2  Aspiration pneumonia  In the setting of recent vomiting  Flu PCR negative  Rec:   · Continue antibiotics as above  · Follow-up MRSA nasal swab  · Follow respiratory status closely  · Continue NPO with aspiration precautions    3  Acute hypoxic respiratory failure  Likely due to # 1/2  On BiPAP  Rec:   · Continue BiPAP/supportive care as per the primary service  · Patient noted to be DNI/DNR    4  Acute encephalopathy on chronic dementia  Consider role of infection  Consider role of Benadryl, Haldol  CT head negative  Rec:  · Follow mental status closely  · Supportive care as per the primary service  · Defer any further workup to primary team    5  Disposition  Unclear prognosis in elderly female with dementia  Rec:  · Await palliative medicine consultation    Antibiotics:  Vancomycin/aztreonam/Flagyl # 1    Thank you for this consultation  We will follow along with you  HISTORY OF PRESENT ILLNESS:  Reason for Consult:  Severe sepsis    HPI: Kike Mendez is a 80 y o  female with dementia who is currently attended on BiPAP and unable to provide any history so this is obtained exclusively through the medical record    Patient initially presented to the emergency department on 11/20 from her nursing home after she was noted to have 2 episodes of emesis  Upon arrival she was noted to be afebrile but had tachycardia, leukocytosis, and a lactic acidosis  She was placed on BiPAP for respiratory distress  She was initially started on cefepime and Flagyl but was noted to have agonal breathing with respiratory distress and rash concerning for severe drug reaction versus anaphylaxis  Patient was given IV Benadryl and Solu-Medrol  She also received Haldol overnight  Her antibiotics were changed to vancomycin and aztreonam   She was noted to have posturing and rigidity on the right and underwent a CT head which showed no acute abnormality  We are asked to comment on further evaluation and management  REVIEW OF SYSTEMS:  Unable to obtain as the patient is obtained and on BiPAP  A complete system-based review of systems is otherwise negative  PAST MEDICAL HISTORY:  Past Medical History:   Diagnosis Date    [facility]ronic obstructive [facility] disease) [facility]     Dementia 2017    Depression     Disease of thyroid gland     Hypothyroidism 2017    Osteoarthritis     Osteoporosis      History reviewed  No pertinent surgical history  FAMILY HISTORY:  Non-contributory    SOCIAL HISTORY:  History   Alcohol Use No     History   Drug use: Unknown     History   Smoking Status    Unknown If Ever Smoked   Smokeless Tobacco    Not on file       ALLERGIES:  Allergies   Allergen Reactions    Cefepime Anaphylaxis    Penicillin G Benzathine Anaphylaxis       MEDICATIONS:  All current active medications have been reviewed      PHYSICAL EXAM:  Vitals:  HR:  [] 98  Resp:  [18-30] 20  BP: (111-204)/(45-95) 124/70  SpO2:  [88 %-100 %] 98 %  Temp (24hrs), Av 7 °F (37 1 °C), Min:97 8 °F (36 6 °C), Max:99 6 °F (37 6 °C)  Current: Temperature: 98 6 °F (37 °C)     Physical Exam:  General:  Frail elderly female, in no acute distress  Eyes:  Conjunctive clear with no hemorrhages or effusions  Oropharynx:  No ulcers, no lesions  Neck:  Supple, no lymphadenopathy  Lungs:  Clear to auscultation bilaterally, decreased respiratory excursion, no accessory muscle use, on BiPAP  Cardiac:  Regular rate and rhythm, no murmurs  Abdomen:  Soft, non-tender, non-distended  Extremities:  No peripheral cyanosis, clubbing, or edema  Skin:  No rashes, no ulcers  Neurological:  Moves all four extremities spontaneously, sensation grossly intact, right arm and leg rigid    LABS, IMAGING, & OTHER STUDIES:  Lab Results:  I have personally reviewed pertinent labs  Results from last 7 days  Lab Units 11/21/17  0842 11/21/17  0812 11/21/17  0624 11/20/17  1602   SODIUM mmol/L 143  --  145 141   POTASSIUM mmol/L 4 4  --  4 1 4 1   CHLORIDE mmol/L 115*  --  116* 105   CO2 mmol/L 22  --  18* 22   ANION GAP mmol/L 6  --  11 14*   BUN mg/dL 24  --  25 23   CREATININE mg/dL 1 16  --  1 16 1 36*   EGFR ml/min/1 73sq m 42  --  42 34   GLUCOSE RANDOM mg/dL 134  --  142* 115   GLUCOSE, ISTAT mg/dl  --  161*  --   --    CALCIUM mg/dL 8 3  --  8 4 10 2*   AST U/L  --   --   --  52*   ALT U/L  --   --   --  33   ALK PHOS U/L  --   --   --  197*   TOTAL PROTEIN g/dL  --   --   --  8 3*   ALBUMIN g/dL  --   --   --  3 5   BILIRUBIN TOTAL mg/dL  --   --   --  0 60       Results from last 7 days  Lab Units 11/21/17  0842 11/21/17  0812 11/21/17  0624 11/20/17  2317 11/20/17  1602   WBC Thousand/uL 20 66*  --  23 82*  --  13 91*   HEMOGLOBIN g/dL 13 3  --  12 3  --  15 2   I STAT HEMOGLOBIN g/dl  --  13 9  --   --   --    PLATELETS Thousands/uL 231  --  245 238 369       Results from last 7 days  Lab Units 11/20/17  2321   INFLUENZA A PCR  None Detected   INFLUENZA B PCR  None Detected   RSV PCR  None Detected     Blood cultures pending    Imaging Studies:   I have personally reviewed pertinent imaging study reports and images in PACS  Chest x-ray 11/20 left basilar pneumonia    CT C/A/P 11/20 bibasilar infiltrates, infectious versus inflammatory bronchiolitis, moderate stool in rectum    EKG, Pathology, and Other Studies:   I have personally reviewed pertinent reports

## 2017-11-21 NOTE — PROGRESS NOTES
S/p rapid response  Labs and CT head ordered  Pt remains unresponsive, but now withdraws to pain Left side better than right  Appears to have posture like movements on the right

## 2017-11-21 NOTE — ED NOTES
Pt being transferred via slets to b at this time  Pt on bipap  Slets at the bedside       Baron Vazquez RN  11/20/17 2037

## 2017-11-21 NOTE — H&P
History and Physical - [facility]    Patient Information: Celina Hearing 80 y o  female MRN: [id number]  Unit/Bed#: [facility] 720-01 Encounter: [id number]  Admitting Physician: Murray Robertson MD  PCP: Vito Vegas MD  Date of Admission:  11/21/17    Assessment/Plan:    Hospital Problem List:     Principal Problem:    Severe sepsis ([facility] )  Active Problems:    COPD (chronic obstructive pulmonary disease) ([place] [address] )    Acute respiratory failure with hypoxia ([place] [address] )    HCAP (healthcare-associated pneumonia)    Ambulatory dysfunction    Hypothyroidism    Acute encephalopathy    BECKY (acute kidney injury) ([place] [address] )    Alzheimer's dementia with behavioral disturbance      Plan for the Primary Problem(s):  · Severe sepsis( HR>90, WBC 50066, RR >20, + acute encephalopathy ,lactic acid 2 7, BECKY) most likely secondary to bronchopneumonia  · Will follow urine culture( patient was recently treated for UTI)  CT of abdomen and pelvis showed mild  diverticulitis  · Check influenza , Legionella and streptococcal urine antigen  · Continue BiPAP as needed  · Level of care should be clarified in a m , patient is DNR DNI currently ,during her previous admission she was level 4/  comfort care only, palliative care consult placed  · ID evaluation  · Continue current antibiotic with HCAP and aspiration coverage  · Robitussin  · History of dysphagia ,will keep patient NPO and proceed with   official speech and swallow evaluation in a m  gentle IV hydration  · Repeat lactic acid after gentle hydration  · Aspiration and fall precautions     Plan for Additional Problems:   · COPD, stable, continue nebs, respiratory protocol   · Dementia with behavioral disturbance aggravated by underlying infection process Haldol p r n  hold for lethargy frequent reorientation  · Hypothyroidism , check TSH  · Hypertension hydralazine p r n      VTE Prophylaxis: Enoxaparin (Lovenox)  / sequential compression device   Code Status: DNR/DNI  POLST: There is no POLST form on file for this patient (pre-hospital)    Anticipated Length of Stay:  Patient will be admitted on an Inpatient basis with an anticipated length of stay of  >2 midnights  Justification for Hospital Stay:  ID consult IV antibiotic BiPAP treatment    Total Time for Visit, including Counseling / Coordination of Care: 45 minutes  Greater than 50% of this total time spent on direct patient counseling and coordination of care  Chief Complaint:   Chills, cough and vomiting    History of Present Illness:    Ghazal Aiken is a 80 y o  female , nursing home resident ,with past medical history of dementia ,COPD, hypothyroidism who was originally presented to [facility] ER with chills, cough and nonbloody vomitus X 2 and found to have O2 sat in high 80s on room air, tachycardia tachypnea with WBC 07116, LA 2 7 secondary to broncho- pneumonia on imaging study  Due to respiratory distress patient was placed on BiPAP and    accepted to step-down 2 level to [facility] ( no ICU beds at [facility])  After arrival patient found to be very agitated ,combative with staff and tried to pull out her IV ,she required 1 dose of Haldol after which patient became more cooperative and calm  She tolerated BiPAP well, O2 sat in high 90s  Given that patient is nursing home resident she was started on broad-spectrum antibiotic for HCAP and to cover possible aspiration  She remains afebrile and hemodynamically stable        Review of Systems:    Review of Systems   Unable to perform ROS: Mental status change       Past Medical and Surgical History:     Past Medical History:   Diagnosis Date    COPD (chronic obstructive pulmonary disease) ([facility] )     Dementia 8/5/2017    Depression     Disease of thyroid gland     Hypothyroidism 8/5/2017    Osteoarthritis     Osteoporosis        History reviewed  No pertinent surgical history      Meds/Allergies:    Prior to Admission medications    Medication Sig Start Date End Date Taking? Authorizing Provider   acetaminophen (TYLENOL) 325 mg tablet Take 650 mg by mouth every 4 (four) hours as needed for mild pain or fever (>100 0)      Historical Provider, MD   artificial tear (LUBRIFRESH P M ) 83-15 % ophthalmic ointment 2 (two) times a day      Historical Provider, MD   bisacodyl (BISAC-EVAC) 10 mg suppository Insert 10 mg into the rectum daily    Historical Provider, MD   dextromethorphan-guaiFENesin (ROBITUSSIN DM)  mg/5 mL syrup Take 10 mL by mouth every 4 (four) hours as needed for cough 7/31/17   Mesfin Speaker, MD   ipratropium-albuterol (DUO-NEB) 0 5-2 5 mg/mL Take 3 mL by nebulization every 4 (four) hours while awake    Historical Provider, MD   magnesium hydroxide (MILK OF MAGNESIA) 400 mg/5 mL oral suspension Take by mouth daily at bedtime    Historical Provider, MD   morphine sulfate, concentrate, 100 MG/5ML concentrated solution Take 10 mg by mouth every hour as needed for severe pain    Historical Provider, MD   Multiple Vitamins-Minerals (PRESERVISION AREDS 2) CAPS Take by mouth    Historical Provider, MD   nitrofurantoin (MACROBID) 100 mg capsule Take 100 mg by mouth 2 (two) times a day    Historical Provider, MD   Sodium Phosphates (ENEMA DISPOSABLE RE) Insert into the rectum daily as needed    Historical Provider, MD     I have reviewed home medications with a medical source (PCP, Pharmacy, other)  Allergies: Allergies   Allergen Reactions    Penicillin G Benzathine        Social History:     Marital Status:     Occupation: none  Patient Pre-hospital Living Situation: NH  Patient Pre-hospital Level of Mobility: unknown  Patient Pre-hospital Diet Restrictions: unknown  Substance Use History:   History   Alcohol Use No     History   Smoking Status    Unknown If Ever Smoked   Smokeless Tobacco    Not on file     History   Drug use: Unknown       Family History:    non-contributory    Physical Exam:     Vitals:   Blood Pressure: 111/54 (11/20/17 2139)  Pulse: 86 (11/20/17 2139)  Temperature: 98 3 °F (36 8 °C) (11/20/17 2139)  Temp Source: Axillary (11/20/17 2139)  Respirations: (!) 23 (oxygen saturation 99%) (11/20/17 2139)  Height: 5' 1 5" (156 2 cm) (11/20/17 2220)  Weight - Scale: 57 1 kg (125 lb 14 1 oz) (11/20/17 2139)  SpO2: 98 % (11/20/17 2322)    Physical Exam   Constitutional: No distress  HENT:   Head: Normocephalic  Eyes: Right eye exhibits no discharge  Left eye exhibits no discharge  Neck: Normal range of motion  Cardiovascular: Regular rhythm  Pulmonary/Chest: No respiratory distress  Abdominal: She exhibits no distension  Musculoskeletal: She exhibits no edema  Neurological:   Drowsy arousable   Skin: Skin is warm  Psychiatric:   Drowsy arousable           Additional Data:     Lab Results: I have personally reviewed pertinent reports  Results from last 7 days  Lab Units 11/20/17  2317 11/20/17  1602   WBC Thousand/uL  --  13 91*   HEMOGLOBIN g/dL  --  15 2   HEMATOCRIT %  --  47 9*   PLATELETS Thousands/uL 238 369   LYMPHO PCT %  --  5*   MONO PCT MAN %  --  2*   EOSINO PCT MANUAL %  --  0       Results from last 7 days  Lab Units 11/20/17  1602   SODIUM mmol/L 141   POTASSIUM mmol/L 4 1   CHLORIDE mmol/L 105   CO2 mmol/L 22   BUN mg/dL 23   CREATININE mg/dL 1 36*   CALCIUM mg/dL 10 2*   TOTAL PROTEIN g/dL 8 3*   BILIRUBIN TOTAL mg/dL 0 60   ALK PHOS U/L 197*   ALT U/L 33   AST U/L 52*   GLUCOSE RANDOM mg/dL 115       Results from last 7 days  Lab Units 11/20/17  1826   INR  1 06       Imaging: I have personally reviewed pertinent reports  Ct Chest Abdomen Pelvis Wo Contrast    Result Date: 11/20/2017  Narrative: CT CHEST, ABDOMEN AND PELVIS WITHOUT IV CONTRAST INDICATION:  Nausea and vomiting, chills  COMPARISON: CT chest dated August 5, 2017  CT abdomen/pelvis dated July 28, 2017   TECHNIQUE: CT examination of the chest, abdomen and pelvis was performed without intravenous contrast   Reformatted images were created in axial, sagittal, and coronal planes  Radiation dose length product (DLP) for this visit:  1281 mGy-cm   This examination, like all CT scans performed in the [facility], was performed utilizing techniques to minimize radiation dose exposure, including the use of iterative reconstruction and automated exposure control  Enteric contrast was not administered  FINDINGS: CHEST LUNGS: There are a few bibasilar patchy airspace opacities suspicious for bronchopneumonia  Multifocal bilateral tree-in-bud opacities are also identified suggestive of an infectious or inflammatory bronchiolitis  Mild to moderate diffuse emphysematous  lung changes are present  There is biapical parenchymal scarring  There a few stable calcified granuloma  There is no tracheal or endobronchial lesion  PLEURA:  Unremarkable  HEART/GREAT VESSELS:  Again noted is a partially calcified pericardium similar in appearance to the prior exam   There is no cardiomegaly  There is mild atherosclerotic calcification of the coronary vessels  The ascending and descending thoracic aorta is normal in course and caliber  MEDIASTINUM AND ELIZABETH:  Unremarkable  CHEST WALL AND LOWER NECK:       Normal  ABDOMEN LIVER/BILIARY TREE:  Unremarkable  GALLBLADDER:  No calcified gallstones  No pericholecystic inflammatory change  SPLEEN:  Unremarkable  PANCREAS:  Unremarkable  ADRENAL GLANDS:  Unremarkable  KIDNEYS/URETERS:  Unremarkable  No hydronephrosis  STOMACH AND BOWEL:  A moderate amount of stool is noted within the rectum  Micrograms diverticulosis APPENDIX:  A normal appendix was visualized  ABDOMINOPELVIC CAVITY:  No ascites or free intraperitoneal air  No lymphadenopathy  VESSELS:  Unremarkable for patient's age  PELVIS REPRODUCTIVE ORGANS:  Unremarkable for patient's age  URINARY BLADDER:  Unremarkable  ABDOMINAL WALL/INGUINAL REGIONS:  Unremarkable  OSSEOUS STRUCTURES:  No acute fracture or destructive osseous lesion    The separate changes of prior T8, T11, T12 and L1 vertebroplasty are present  There is moderate chronic-appearing compression deformity of the T6 and T7 vertebral bodies  There is mild chronic appearing compression deformity of L5  There is mild diffuse bone demineralization  Impression: Small patchy bibasilar airspace opacities suspicious for bronchopneumonia  Diffuse bilateral tree-in-bud opacities are also noted suggestive of an infectious or inflammatory bronchiolitis  A repeat CT chest in 6-8 weeks following treatment is recommended to assess for improvement/resolution  Mild to moderate diffuse emphysematous lung changes  Stable pericardial calcification  Scattered colonic diverticulosis with no inflammatory changes present suggest acute diverticulitis  Moderate amount of stool noted within the rectum  Workstation performed: [id number]     Xr Chest 1 View Portable    Result Date: 11/20/2017  Narrative: CHEST  INDICATION: Vomiting  COMPARISON:  8/5/2017  VIEWS:   AP frontal; 1 image FINDINGS:    The cardiomediastinal silhouette is stable  Left lung base opacities are present atelectasis or pneumonia are noted  Osseous structures are age appropriate  Impression: Left lung base opacities reflecting atelectasis or pneumonia  Workstation performed: [id number]       EKG, Pathology, and Other Studies Reviewed on Admission:   · EKG: sinus tachy    Allscripts / Epic Records Reviewed: Yes     ** Please Note: This note has been constructed using a voice recognition system   **

## 2017-11-21 NOTE — PLAN OF CARE
SAFETY,RESTRAINT: NV/NON-SELF DESTRUCTIVE BEHAVIOR     Remains free of harm/injury (restraint for non violent/non self-detsructive behavior) Completed     Returns to optimal restraint-free functioning Completed          DISCHARGE PLANNING     Discharge to home or other facility with appropriate resources Not Progressing        Knowledge Deficit     Patient/family/caregiver demonstrates understanding of disease process, treatment plan, medications, and discharge instructions Not Progressing        Nutrition/Hydration-ADULT     Nutrient/Hydration intake appropriate for improving, restoring or maintaining nutritional needs Not Progressing        RESPIRATORY - ADULT     Achieves optimal ventilation and oxygenation Not Progressing          INFECTION - ADULT     Absence or prevention of progression during hospitalization Progressing     Absence of fever/infection during neutropenic period Progressing        PAIN - ADULT     Verbalizes/displays adequate comfort level or baseline comfort level Progressing        Potential for Falls     Patient will remain free of falls Progressing        Prexisting or High Potential for Compromised Skin Integrity     Skin integrity is maintained or improved Progressing        SAFETY ADULT     Maintain or return to baseline ADL function Progressing     Maintain or return mobility status to optimal level Progressing

## 2017-11-21 NOTE — EMTALA/ACUTE CARE TRANSFER
[address] [address] [address]  Dept: [phone]      EMTALA TRANSFER CONSENT    NAME Markos Hernandez                                         6/15/1927                              MRN [id number]    I have been informed of my rights regarding examination, treatment, and transfer   by Dr Kasie Shine DO    Benefits: Specialized equipment and/or services available at the receiving facility (Include comment)________________________    Risks: Potential for delay in receiving treatment      Consent for Transfer:  I acknowledge that my medical condition has been evaluated and explained to me by the emergency department physician or other qualified medical person and/or my attending physician, who has recommended that I be transferred to the service of  Accepting Physician: Dr Edwina Garcia at [address] [place] [address] Name, [address] : Roberta   The above potential benefits of such transfer, the potential risks associated with such transfer, and the probable risks of not being transferred have been explained to me, and I fully understand them  The doctor has explained that, in my case, the benefits of transfer outweigh the risks  I agree to be transferred  I authorize the performance of emergency medical procedures and treatments upon me in both transit and upon arrival at the receiving facility  Additionally, I authorize the release of any and all medical records to the receiving facility and request they be transported with me, if possible  I understand that the safest mode of transportation during a medical emergency is an ambulance and that the Hospital advocates the use of this mode of transport  Risks of traveling to the receiving facility by car, including absence of medical control, life sustaining equipment, such as oxygen, and medical personnel has been explained to me and I fully understand them      [facility]  [  ]  I consent to the stated transfer and to be transported by ambulance/helicopter  [  ]  I consent to the stated transfer, but refuse transportation by ambulance and accept full responsibility for my transportation by car  I understand the risks of non-ambulance transfers and I exonerate the Hospital and its staff from any deterioration in my condition that results from this refusal     X___________________________________________    DATE  17  TIME________  Signature of patient or legally responsible individual signing on patient behalf           RELATIONSHIP TO PATIENT_________________________          Provider Certification    NAME Grace Ganser                                        [facility] 6/15/1927                              MRN [id number]    A medical screening exam was performed on the above named patient  Based on the examination:    Condition Necessitating Transfer The primary encounter diagnosis was Bilateral pneumonia  Diagnoses of UTI (urinary tract infection), Severe sepsis ([address] ), and Respiratory distress were also pertinent to this visit      Patient Condition: The patient has been stabilized such that within reasonable medical probability, no material deterioration of the patient condition or the condition of the unborn child(asaf) is likely to result from the transfer    Reason for Transfer: Level of Care needed not available at this facility    Transfer Requirements: [address]   · Space available and qualified personnel available for treatment as acknowledged by Morris Coley  · Agreed to accept transfer and to provide appropriate medical treatment as acknowledged by       Dr Mayra Mar  · Appropriate medical records of the examination and treatment of the patient are provided at the time of transfer   [address] _______  · Transfer will be performed by qualified personnel from    and appropriate transfer equipment as required, including the use of necessary and appropriate life support measures  Provider Certification: I have examined the patient and explained the following risks and benefits of being transferred/refusing transfer to the patient/family:  General risk, such as traffic hazards, adverse weather conditions, rough terrain or turbulence, possible failure of equipment (including vehicle or aircraft), or consequences of actions of persons outside the control of the transport personnel      Based on these reasonable risks and benefits to the patient and/or the unborn child(asaf), and based upon the information available at the time of the patients examination, I certify that the medical benefits reasonably to be expected from the provision of appropriate medical treatments at another medical facility outweigh the increasing risks, if any, to the individuals medical condition, and in the case of labor to the unborn child, from effecting the transfer      X____________________________________________ DATE 11/20/17        TIME_______      ORIGINAL - SEND TO MEDICAL RECORDS   COPY - SEND WITH PATIENT DURING TRANSFER

## 2017-11-21 NOTE — RESPIRATORY THERAPY NOTE
RT Protocol Note  Marianela Cotto 80 y o  female MRN: [id number]  Unit/Bed#: [facility] 720-01 Encounter: [id number]    Assessment    Active Problems:    * No active hospital problems  *      Home Pulmonary Medications:      Past Medical History:   Diagnosis Date    COPD (chronic obstructive pulmonary disease) ([facility] )     Dementia 8/5/2017    Depression     Disease of thyroid gland     Hypothyroidism 8/5/2017    Osteoarthritis     Osteoporosis      Social History     Social History    Marital status:      Spouse name: N/A    Number of children: N/A    Years of education: N/A     Social History Main Topics    Smoking status: Unknown If Ever Smoked    Smokeless tobacco: Not on file    Alcohol use No    Drug use: Unknown    Sexual activity: Not on file     Other Topics Concern    Not on file     Social History Narrative    No narrative on file       Subjective         Objective    Physical Exam:   Assessment Type: Assess only  General Appearance: Awake  Respiratory Pattern: Normal  Chest Assessment: Chest expansion symmetrical  Bilateral Breath Sounds: Diminished, Coarse  O2 Device: bipap    Vitals:  Pulse 88, resp  rate (!) 23, SpO2 99 %  Imaging and other studies: I have personally reviewed pertinent reports        O2 Device: bipap     Plan    Respiratory Plan: Mild Distress pathway        Resp Comments: recived pt  with EMS at this time from [facility], pt came on a bipap12/5, 60% fio2, tolerating well with no signs of distress noted at this point, pt ordered on xopenex 1 25mg/NSS Q6 with , airway clearance protocol initiated, Acapella ordered

## 2017-11-21 NOTE — OCCUPATIONAL THERAPY NOTE
Occupational Therapy       Patient Name: Peter Chávez  CBRGA'I Date: 11/21/2017   OT consult received  Chart review completed  Pt  S/p rapid response this Am  Txfer to ICU  Pt  Currently on bipap  Pt  Cancelled this AM will continue to follow when medically appropriate to assists with d c  Planning/rec      ERMA Welch, OTRL  11/21/2017  8:47 AM

## 2017-11-21 NOTE — PROGRESS NOTES
Patient to receive Cefepime (MAXIPIME) 2,000 mg in dextrose 5% 50ml IVPB over 30 minutes  Hard stop occurred during administration due to a patient allergy  Spoke with Haley Vann PA-C with [facility], patient received previous dose in ED  Verbal instructions to administer medication as scheduled  Vancomycin and Flagyl still infusing  Will pass message along to day shift that [facility] is okay with administration

## 2017-11-21 NOTE — PROGRESS NOTES
Entered patient's room to reassess rash and neuro status  Found patient unresponsive, agonal, and SpO2 76%  Increased O2 to flush through bipap  Pt unresponsive to voice or painful stiulus  Rapid response called at this time  Refer to Rapid response charting

## 2017-11-21 NOTE — PROGRESS NOTES
Lactic acid 2 5, Magy Lewis PA-C with [facility] made aware  NSS already infusing at 75ml/hr  Will redraw lactic in 2 hours

## 2017-11-21 NOTE — RAPID RESPONSE
Progress Note - Rapid Response   Mary Kate Washington 80 y o  female MRN: [id number]    Time Called ( Time): 7008  Room#: 915  DWUVMMM Time ( Time): 6278  Event End Time ( Time): 0830  MEWS score at time of Rapid Response: 9  Primary reason for call: Acute change in mental status  Interventions:  Airway/Breathing:  NPPV  Circulation: IV Fluid Bolus  Other Treatments: IV access       Assessment:   1  Acute respiratory distress  2  Possible anaphylactic reaction   3  Stroke like symptoms  4  Mixed metabolic and respiratory acidosis    Plan:   · Patient was placed on bipap prior to arrival as she is a DNR/DNI  She was given benadryl IV, solumedrol IV, and pepcid IV for possible allergic/anaphylactic reaction as patient had evidence of rash, given cefepime for PNA and has a penicillin allergy  Continue solumderol and benadryl IV  · Antibiotics changed to aztreonam  · Will continue bipap  · Obtain CT brain stat to rule out hemorrhage  · Given IV fluid bolus   · Medicine has spoke to family who wish to continue her DNR/DNI status and if agree with above work up but do not want to escalate her care if she continues to decompensate as she has previously been on hospice   · Keep on stepdown level II   · Repeat labs including lactate        HPI/Chief Complaint (Background/Situation):   Faustina Blackburn is a 80y o  year old female who presents with pneumonia  She was admitted to step down level II  Upon presentation to floor she had her baseline confusion but knew her name and date of birth  She became acutely unresponsive, respiratory distress with agonal breathing, and she had worsening of a maculopapular rash  Historical Information   Past Medical History:   Diagnosis Date    COPD [facility]chronic obstructive [facility] disease) ([facility] [address] )     Dementia 8/5/2017    Depression     Disease of thyroid gland     Hypothyroidism 8/5/2017    Osteoarthritis     Osteoporosis      History reviewed   No pertinent surgical history  Social History   History   Alcohol Use No     History   Drug use: Unknown     History   Smoking Status    Unknown If Ever Smoked   Smokeless Tobacco    Not on file     Family History: non-contributory    Meds/Allergies     artificial tear  Both Eyes BID   aztreonam 2,000 mg Intravenous Q8H   diphenhydrAMINE      diphenhydrAMINE 25 mg Intravenous Q6H   docusate sodium 100 mg Oral BID   enoxaparin 30 mg Subcutaneous Daily   famotidine 20 mg Intravenous Q12H [address]   ipratropium 0 5 mg Nebulization Q6H   levalbuterol 1 25 mg Nebulization Q6H   magnesium hydroxide 15 mL Oral HS   methylPREDNISolone sodium succinate      methylPREDNISolone sodium succinate 125 mg Intravenous Q6H [address]   metroNIDAZOLE 500 mg Intravenous Q8H   morphine injection 2 mg Intravenous Once   senna 1 tablet Oral Daily   sodium chloride 500 mL Intravenous Once   vancomycin 15 mg/kg Intravenous Q24H         sodium chloride 75 mL/hr Last Rate: 75 mL/hr (11/20/17 2154)       Allergies   Allergen Reactions    Penicillin G Benzathine        ROS: Patient unable to participate as she is unresponsive    Physical Exam:  Gen: unresponsive, pupils pinpoint and not reactive  After some resuscitation pupils were 3mm sluggishly reactive but equal   HEENT: pupils reactive after resuscitation  Normocepahlic  Neck: soft  Chest:tachypnic, coarse breath sounds with scattered rales, on bipap  Tachycardic and hypertensive  Abd: soft  Neuro: unresponsive  GCS E1V2M5 RUE extensor posture improved to withdrawal, LUE withdrawal from pain, RLE withdrawal to pain LLE purpuseful  Overall weakness, but R sided is 3/5 vs 4/5 on left  Moving left side spontaneously but not right     Skin:maculopapular rash across trunk and thighs      Intake/Output Summary (Last 24 hours) at 11/21/17 0847  Last data filed at 11/21/17 0630   Gross per 24 hour   Intake              200 ml   Output              250 ml   Net              -50 ml       Respiratory    Lab Data (Last 4 hours)    None         O2/Vent Data (Last 4 hours)    None              Invasive Devices     Peripheral Intravenous Line            Peripheral IV 11/20/17 Left Antecubital less than 1 day    Peripheral IV 11/20/17 Right Forearm less than 1 day          Drain            Urethral Catheter Double-lumen 12 Fr  105 days                DIAGNOSTIC DATA:    Lab: I have personally reviewed pertinent lab results  CBC:     Results from last 7 days  Lab Units 11/21/17  0624   WBC Thousand/uL 23 82*   HEMOGLOBIN g/dL 12 3   HEMATOCRIT % 38 1   PLATELETS Thousands/uL 245     CMP:     Results from last 7 days  Lab Units 11/21/17  0624 11/20/17  1602   SODIUM mmol/L 145 141   POTASSIUM mmol/L 4 1 4 1   CHLORIDE mmol/L 116* 105   CO2 mmol/L 18* 22   BUN mg/dL 25 23   CREATININE mg/dL 1 16 1 36*   CALCIUM mg/dL 8 4 10 2*   TOTAL PROTEIN g/dL  --  8 3*   BILIRUBIN TOTAL mg/dL  --  0 60   ALK PHOS U/L  --  197*   ALT U/L  --  33   AST U/L  --  52*   GLUCOSE RANDOM mg/dL 142* 115     PT/INR:   Lab Results   Component Value Date    INR 1 06 11/20/2017   ,   Magnesium: No components found for: MAG,   Phosphorous: No results found for: PHOS    Microbiology:  Lab Results   Component Value Date    BLOODCX No Growth After 5 Days  08/05/2017    BLOODCX No Growth After 5 Days  08/05/2017    BLOODCX No Growth After 5 Days  07/28/2017    URINECX No Growth <1000 cfu/mL 08/05/2017         OUTCOME:   Stayed in room  and Attending service notified  Family notified of transfer: yes- notified that patient would continue stepdown level II  Family member contacted: by medicine team  Code Status: Level 3 - DNAR and DNI  Critical Care Time: Total Critical Care time spent 30 minutes excluding procedures, teaching and family updates

## 2017-11-21 NOTE — RESPIRATORY THERAPY NOTE
RT Ventilator Management Note  Marianela Cotto 80 y o  female MRN: [id number]  Unit/Bed#: [facility] 720-01 Encounter: [id number]      Daily Screen     No data found  Physical Exam:   Assessment Type: Assess only  General Appearance: Awake  Respiratory Pattern: Normal  Chest Assessment: Chest expansion symmetrical  Bilateral Breath Sounds: Diminished, Coarse  O2 Device: bipap      Resp Comments: Patient remains comfortable on bipap  No sob or resp distress noted  Will continue to monitor

## 2017-11-21 NOTE — PLAN OF CARE
DISCHARGE PLANNING     Discharge to home or other facility with appropriate resources Progressing        INFECTION - ADULT     Absence or prevention of progression during hospitalization Progressing     Absence of fever/infection during neutropenic period Progressing        Knowledge Deficit     Patient/family/caregiver demonstrates understanding of disease process, treatment plan, medications, and discharge instructions Progressing        PAIN - ADULT     Verbalizes/displays adequate comfort level or baseline comfort level Progressing        Potential for Falls     Patient will remain free of falls Progressing        Prexisting or High Potential for Compromised Skin Integrity     Skin integrity is maintained or improved Progressing        RESPIRATORY - ADULT     Achieves optimal ventilation and oxygenation Progressing        SAFETY ADULT     Maintain or return to baseline ADL function Progressing     Maintain or return mobility status to optimal level Progressing

## 2017-11-21 NOTE — SPEECH THERAPY NOTE
11/21:  Assessment:   1  Acute respiratory distress  2  Possible anaphylactic reaction   3  Stroke like symptoms  4  Mixed metabolic and respiratory acidosis     Plan:   · Patient was placed on bipap prior to arrival as she is a DNR/DNI  She was given benadryl IV, solumedrol IV, and pepcid IV for possible allergic/anaphylactic reaction as patient had evidence of rash, given cefepime for PNA and has a penicillin allergy  Continue solumderol and benadryl IV  · Antibiotics changed to aztreonam  · Will continue bipap  · Obtain CT brain stat to rule out hemorrhage  · Given IV fluid bolus   · Medicine has spoke to family who wish to continue her DNR/DNI status and if agree with above work up but do not want to escalate her care if she continues to decompensate as she has previously been on hospice   · Keep on stepdown level II   · Repeat labs including lactate         HPI/Chief Complaint (Background/Situation):   Van Stephen is a 80y o  year old female who presents with pneumonia  She was admitted to step down level II  Upon presentation to floor she had her baseline confusion but knew her name and date of birth  She became acutely unresponsive, respiratory distress with agonal breathing, and she had worsening of a maculopapular rash  Went to patients room, spoke w/ nurse  She is currently on bipap and completely unresponsive  Going for head CT  Will d/c order  Please reconsult when/if appropriate

## 2017-11-21 NOTE — CONSULTS
Consultation - Brandon Calderon 80 y o  female MRN: [id number]  Unit/Bed#: [facility] 720-01 Encounter: [id number]      Assessment/Plan     Assessment:  Patient Active Problem List   Diagnosis    [facility] )    Severe sepsis ([facility] )    Acute respiratory failure with hypoxia ([facility] )    HCAP (healthcare-associated pneumonia)    Ambulatory dysfunction    Respiratory distress    Hypothyroidism    Dementia    Acute encephalopathy    BECKY (acute kidney injury) ([place] [address] )    Alzheimer's dementia with behavioral disturbance     Plan:  Spoke with patient's son Kenton Blum over phone - as he had discussed earlier with patient's attending, plan for continuation of BiPAP, antibiotics, conservative measures  No escalation of care, no transfer to ICU  If patient is declining or not improving over next couple days, consider transition to comfort care  Palliative care will continue to follow along  History of Present Illness   Physician Requesting Consult: Jaclyn Coko DO  Reason for Consult / Principal Problem: goals of care  Hx and PE limited by: dementia, altered mental status  HPI: Yovani Rivero is a 80 y o  female who presented last night from her nursing facility with severe sepsis presumed to be due to bronchopneumonia  Was hypoxic, tachypneic, tachycardic in ER, placed on BiPAP  This morning patient noted to have acute change in mental status, unresponsiveness, increased respiratory distress, and new rash  Started on IV steroids and diphenhydramine as well as alternate antibiotics  Per discussion between family and attending, plan for continuation of BiPAP, antibiotics, and conservative measures, however ultimate goal is comfort  Other history includes dementia with behavioral disturbance and COPD   After discharge from most recent hospital stay (8/4-8/7), patient had gone to nursing facility on 'comfort care' at [place] but was not officially signed onto hospice  Son states that patient has not been satisfied with her quality of life, as due to health issues, has declined functionally, and has not been at previous baseline  She has previously stated to family that she did not want any "tubes "       Inpatient consult to Palliative Care  Consult performed by: Scooby Whitley  Consult ordered by: Pablo Saleh          Review of Systems   Unable to perform ROS: Mental status change       Historical Information   Past Medical History:   Diagnosis Date    COPD [facility]chronic obstructive pulmonary disease) [facility] [address] [facility]     Dementia 8/5/2017    Depression     Disease of thyroid gland     Hypothyroidism 8/5/2017    Osteoarthritis     Osteoporosis      History reviewed  No pertinent surgical history  Social History     Social History    Marital status:      Spouse name: N/A    Number of children: N/A    Years of education: N/A     Social History Main Topics    Smoking status: Unknown If Ever Smoked    Smokeless tobacco: None    Alcohol use No    Drug use: Unknown    Sexual activity: Not Asked     Other Topics Concern    None     Social History Narrative    None     History reviewed  No pertinent family history      Meds/Allergies   all current active meds have been reviewed and current meds:   Current Facility-Administered Medications   Medication Dose Route Frequency    acetaminophen (TYLENOL) rectal suppository 650 mg  650 mg Rectal Q4H PRN    artificial tear (LUBRIFRESH P M ) ophthalmic ointment   Both Eyes BID    aztreonam (AZACTAM) 1,000 mg in sodium chloride 0 9 % 50 mL IVPB  1,000 mg Intravenous Q8H    dextromethorphan-guaiFENesin (ROBITUSSIN DM)  mg/5 mL oral syrup 10 mL  10 mL Oral Q4H PRN    diphenhydrAMINE (BENADRYL) injection 50 mg  50 mg Intravenous Q6H    docusate sodium (COLACE) capsule 100 mg  100 mg Oral BID    enoxaparin (LOVENOX) subcutaneous injection 30 mg  30 mg Subcutaneous Daily    [START ON 11/22/2017] famotidine (PEPCID) injection 20 mg  20 mg Intravenous Daily    haloperidol lactate (HALDOL) injection 2 mg  2 mg Intramuscular Q6H PRN    hydrALAZINE (APRESOLINE) injection 5 mg  5 mg Intravenous Q6H PRN    ipratropium (ATROVENT) 0 02 % inhalation solution 0 5 mg  0 5 mg Nebulization Q6H    levalbuterol (XOPENEX) inhalation solution 1 25 mg  1 25 mg Nebulization Q6H    magnesium hydroxide (MILK OF MAGNESIA) 400 mg/5 mL oral suspension 15 mL  15 mL Oral HS    methylPREDNISolone sodium succinate (Solu-MEDROL) injection 125 mg  125 mg Intravenous Q6H [address]    metroNIDAZOLE (FLAGYL) IVPB (premix) 500 mg  500 mg Intravenous Q8H    ondansetron (ZOFRAN) injection 4 mg  4 mg Intravenous Q6H PRN    senna (SENOKOT) tablet 8 6 mg  1 tablet Oral Daily    sodium chloride 0 9 % infusion  75 mL/hr Intravenous Continuous    sodium phosphate-biphosphate (FLEET) enema 1 enema  1 enema Rectal Daily PRN    vancomycin (VANCOCIN) IVPB (premix) 750 mg  15 mg/kg Intravenous Q24H         Allergies   Allergen Reactions    Cefepime Anaphylaxis    Penicillin G Benzathine Anaphylaxis       Objective     Physical Exam   Constitutional: She appears lethargic  She appears ill  HENT:   Head: Normocephalic and atraumatic  Right Ear: External ear normal    Left Ear: External ear normal    Mouth/Throat: Mucous membranes are normal    Eyes: Lids are normal    Neck: Trachea normal  Neck supple  Cardiovascular: Tachycardia present  Pulmonary/Chest: Tachypnea noted  On BiPAP, increased work of breathing   Neurological: She appears lethargic  Altered mental status, baseline dementia   Skin: There is pallor  Psychiatric: She is agitated  Restless       Lab Results: I have personally reviewed pertinent labs  Imaging Studies: I have personally reviewed pertinent reports        Code Status: Level 3 - DNAR and DNI  Advance Directive and Living Will:    none on file  Power of :  presumed to be son Bobbi Kenny by default  POLST:  none

## 2017-11-21 NOTE — PROGRESS NOTES
[facility] Progress Note  Patient: Sadia Bell 80 y o  female   MRN: [id number]  PCP: Peter Back MD  Unit/Bed#: [facility] 720-01 Encounter: [id number]  Date Of Visit: 11/21/17    Assessment:    Principal Problem:    Severe sepsis ([place] [address] )  Active Problems:    COPD (chronic obstructive pulmonary disease) ([place] [address] )    Acute respiratory failure with hypoxia ([place] [address] )    HCAP (healthcare-associated pneumonia)    Ambulatory dysfunction    Hypothyroidism    Acute encephalopathy    BECKY (acute kidney injury) ([place] [address] )    Alzheimer's dementia with behavioral disturbance      Plan:  Severe sepsis 2/2 Asp PNA: appreciate ID consult  Pt appeared to have Cefepime allergy  Currently onAztreonam, Flagyl, and Vanco   Can d/c Vanco if MRSA swab negative  U strep and legionella pending  Flu neg  LA resolved with IVF  COPD exac: Pt is on solumderol 125 mg q6h for possible allergic reaction - consulted pulm to help with steroids  Also on resp protocol  Acute hypoxic resp fail on bipap: pt dnr/dni  C/w Bipap  Pulm consulted  ?allergic rxn: Pt is on solumedrol 125 q6h and pepcid and was on benadryl - benadryl stopped  Dementia:  Pt at baseline not fully oriented    Acute encephalopathy: CT head WNL  Suspect 2/2 severe sepsis  GOC: can c/w current tx  Level 3 DNR/DNI with goal of keeping comfortable  No pressors or central lines  VTE Pharmacologic Prophylaxis:   Pharmacologic: Enoxaparin (Lovenox)  Mechanical VTE Prophylaxis in Place: Yes    Patient Centered Rounds: I have performed bedside rounds with nursing staff today  Discussions with Specialists or Other Care Team Provider: icu    Education and Discussions with Family / Patient: pt    Time Spent for Care: 30 minutes  More than 50% of total time spent on counseling and coordination of care as described above      Current Length of Stay: 1 day(s)    Current Patient Status: Inpatient   Certification Statement: The patient will continue to require additional inpatient hospital stay due to above    Discharge Plan / Estimated Discharge Date: n/a    Code Status: Level 3 - DNAR and DNI      Subjective:   Pt seen and examined by me at Rapid Response at 8 AM   Pt was noted to be tachypnic and have mental status change  Objective:     Vitals:   Temp (24hrs), Av 7 °F (37 1 °C), Min:97 8 °F (36 6 °C), Max:99 6 °F (37 6 °C)    HR:  [] 117  Resp:  [18-30] 30  BP: (111-204)/(45-95) 204/60  SpO2:  [88 %-100 %] 96 %  Body mass index is 23 4 kg/m²  Input and Output Summary (last 24 hours): Intake/Output Summary (Last 24 hours) at 17 0955  Last data filed at 17 0920   Gross per 24 hour   Intake          1706 25 ml   Output              250 ml   Net          1456 25 ml       Physical Exam:     Physical Exam   Constitutional: She appears well-developed  She appears distressed (moderate resp)  HENT:   Head: Normocephalic and atraumatic  Eyes: Conjunctivae and EOM are normal    Neck: Normal range of motion  Neck supple  Cardiovascular: Normal rate and regular rhythm  Pulmonary/Chest:   Diffuse rhonchi with increased work of breathing   Abdominal: Soft  Bowel sounds are normal  She exhibits no distension  There is no tenderness  Musculoskeletal: Normal range of motion  She exhibits no edema  Neurological:   Responsive to painful stimuli   Skin: Skin is warm and dry  There is erythema (abdomen)  Additional Data:     Labs:      Results from last 7 days  Lab Units 17  0842  17  1602   WBC Thousand/uL 20 66*  < > 13 91*   HEMOGLOBIN g/dL 13 3  < > 15 2   I STAT HEMOGLOBIN   --   < >  --    HEMATOCRIT % 40 8  < > 47 9*   PLATELETS Thousands/uL 231  < > 369   LYMPHO PCT %  --   --  5*   MONO PCT MAN %  --   --  2*   EOSINO PCT MANUAL %  --   --  0   < > = values in this interval not displayed      Results from last 7 days  Lab Units 17  0842  17  1602   SODIUM mmol/L 143  < > 141   POTASSIUM mmol/L 4 4 < > 4 1   CHLORIDE mmol/L 115*  < > 105   CO2 mmol/L 22  < > 22   BUN mg/dL 24  < > 23   CREATININE mg/dL 1 16  < > 1 36*   CALCIUM mg/dL 8 3  < > 10 2*   TOTAL PROTEIN g/dL  --   --  8 3*   BILIRUBIN TOTAL mg/dL  --   --  0 60   ALK PHOS U/L  --   --  197*   ALT U/L  --   --  33   AST U/L  --   --  52*   GLUCOSE RANDOM mg/dL 134  < > 115   GLUCOSE, ISTAT   --   < >  --    < > = values in this interval not displayed  Results from last 7 days  Lab Units 11/20/17  1826   INR  1 06       * I Have Reviewed All Lab Data Listed Above  * Additional Pertinent Lab Tests Reviewed: [facility] Admission Reviewed    Imaging:    Imaging Reports Reviewed Today Include: CXR and CT head  Imaging Personally Reviewed by Myself Includes:  n/a    Recent Cultures (last 7 days):       Results from last 7 days  Lab Units 11/20/17  2321   INFLUENZA A PCR  None Detected   INFLUENZA B PCR  None Detected   RSV PCR  None Detected       Last 24 Hours Medication List:     artificial tear  Both Eyes BID   aztreonam 1,000 mg Intravenous Q8H   diphenhydrAMINE 50 mg Intravenous Q6H   docusate sodium 100 mg Oral BID   enoxaparin 30 mg Subcutaneous Daily   [START ON 11/22/2017] famotidine 20 mg Intravenous Daily   ipratropium 0 5 mg Nebulization Q6H   levalbuterol 1 25 mg Nebulization Q6H   magnesium hydroxide 15 mL Oral HS   methylPREDNISolone sodium succinate 125 mg Intravenous Q6H [address]   metroNIDAZOLE 500 mg Intravenous Q8H   senna 1 tablet Oral Daily   vancomycin 15 mg/kg Intravenous Q24H        Today, Patient Was Seen By: Hardik Salazar DO    ** Please Note: This note has been constructed using a voice recognition system   **

## 2017-11-22 NOTE — PHYSICAL THERAPY NOTE
Physical Therapy Cancellation Note      Pts orders received and chart reviewed  Pt s/p rapid response yesterday 11/21/17  Pt remains on BiPap, and palliative care was consulted  PT will continue to follow during hospital stay      Seble Castro, PT

## 2017-11-22 NOTE — PROGRESS NOTES
Progress Note - Infectious Disease   Celina Hearing 80 y o  female MRN: [id number]  Unit/Bed#: [facility] 720-01 Encounter: [id number]      Impression/Recommendations:  1  Severe sepsis  POA:  Leukocytosis, tachycardia, and lactic acidosis  Likely due to # 2  Blood and urine cultures, CT A/P negative  Hemodynamically stable  Fever and WBC improved although overall clinically unchanged  Rec:       · Continue vancomycin/aztreonam/Flagyl for now  · Follow-up final blood cultures from admission  · Follow temperatures and white blood cell count closely  · Continue supportive care as per the primary service     2  Aspiration pneumonia  In the setting of recent vomiting  Flu PCR negative  Rec:       · Continue antibiotics as above  · Follow-up MRSA nasal swab  · Follow respiratory status closely  · Continue NPO with aspiration precautions     3  Acute hypoxic respiratory failure  Likely due to # 1/2; Remains on BiPAP  Rec:       · Continue BiPAP/supportive care as per the primary service  · Patient noted to be DNI/DNR     4   Acute encephalopathy on chronic dementia  Consider role of infection  Consider role of Benadryl, Haldol  CT head negative  No appreciable improvement despite maximal medical therapy  Rec:  · Follow mental status closely  · Supportive care as per the primary service     5  Disposition  Prognosis appears poor in the setting of advanced dementia and minimal improvement despite maximal medical therapy  Patient previously had been on comfort care but not officially hospice  Rec:  · Agree that hospice would be appropriate  · Await final decision from family regarding goals of care     Antibiotics:  Vancomycin/aztreonam/Flagyl #2    Subjective:  Patient seen on AM rounds  Remains obtained and unresponsive on BiPAP  No documented fevers, chills, sweats, nausea, vomiting, or diarrhea      Objective:  Vitals:  HR:  [77-91] 83  Resp:  [20-22] 22  BP: (118-199)/(50-77) 143/50  SpO2:  [96 %-100 %] 96 %  Temp (24hrs), Av 7 °F (36 5 °C), Min:96 3 °F (35 7 °C), Max:98 7 °F (37 1 °C)  Current: Temperature: (!) 96 3 °F (35 7 °C)    Physical Exam:   General:  No acute distress  Psychiatric:  Up tended, unresponsive to voice or physical stimulus  Pulmonary:  Normal respiratory excursion without accessory muscle use  Abdomen:  Soft, nontender  Extremities:  No edema  Skin:  No rashes    Lab Results:  I have personally reviewed pertinent labs  Results from last 7 days  Lab Units 17  0558 17  0842 17  0812 17  0624 17  1602   SODIUM mmol/L 145 143  --  145 141   POTASSIUM mmol/L 4 1 4 4  --  4 1 4 1   CHLORIDE mmol/L 118* 115*  --  116* 105   CO2 mmol/L 20* 22  --  18* 22   ANION GAP mmol/L 7 6  --  11 14*   BUN mg/dL 23 24  --  25 23   CREATININE mg/dL 0 89 1 16  --  1 16 1 36*   EGFR ml/min/1 73sq m 57 42  --  42 34   GLUCOSE RANDOM mg/dL 134 134  --  142* 115   GLUCOSE, ISTAT mg/dl  --   --  161*  --   --    CALCIUM mg/dL 8 2* 8 3  --  8 4 10 2*   AST U/L  --   --   --   --  52*   ALT U/L  --   --   --   --  33   ALK PHOS U/L  --   --   --   --  197*   TOTAL PROTEIN g/dL  --   --   --   --  8 3*   ALBUMIN g/dL  --   --   --   --  3 5   BILIRUBIN TOTAL mg/dL  --   --   --   --  0 60       Results from last 7 days  Lab Units 17  0558 17  0842 17  0812 17  0624   WBC Thousand/uL 18 81* 20 66*  --  23 82*   HEMOGLOBIN g/dL 11 8 13 3  --  12 3   I STAT HEMOGLOBIN g/dl  --   --  13 9  --    PLATELETS Thousands/uL 205 231  --  245       Results from last 7 days  Lab Units 17  0329 17  2321 17  1730 17  1724 17  1723   BLOOD CULTURE   --   --   --  No Growth at 24 hrs  No Growth at 24 hrs     URINE CULTURE  No Growth <1000 cfu/mL  --  No Growth <1000 cfu/mL  --   --    INFLUENZA A PCR   --  None Detected  --   --   --    INFLUENZA B PCR   --  None Detected  --   --   --    RSV PCR   --  None Detected  --   --   --        Imaging Studies:   I have personally reviewed pertinent imaging study reports and images in PACS  EKG, Pathology, and Other Studies:   I have personally reviewed pertinent reports

## 2017-11-22 NOTE — OCCUPATIONAL THERAPY NOTE
Occupational Therapy    Patient Name: Hugo Barros  XYKWO'[id number] Date: 11/22/2017    OT consult received, chart review completed, status check performed, pt s/p rapid response yesterday am, decided no ultimate transfer to ICU or escalation of care, conservative measures, remains in bipap, palliative care consulted and will continue to follow to monitor potential improvement or decline over the next few days, if not improving consider transition to comfort care  Pt cx'd this am as currently on bipap, will continue to follow to initiate OT eval to A w/ safe d/c planning/recommendations  Thank you for the consult!  Please call if you have any questions: Bettie Van, CHIOMA, OTR/L, C-GCM, CSRS  [facility]

## 2017-11-22 NOTE — PROGRESS NOTES
Progress Note - Palliative & Supportive Care  Kike Mendez  80 y o   female  PPHP 720/PPHP 720-01   MRN: [id number]  Encounter: [id number]     Assessment  Patient Active Problem List   Diagnosis    [facility]chronic obstructive [facility] disease) ([facility] )    Severe sepsis ([facility] )    Acute respiratory failure with hypoxia ([facility] )    HCAP (healthcare-associated pneumonia)    Ambulatory dysfunction    Respiratory distress    Hypothyroidism    Dementia    Acute encephalopathy    BECKY (acute kidney injury) ([facility] )    Alzheimer's dementia with behavioral disturbance     Plan:  Goals of Care:    - Spoke with son Yg Chong, who is POA, over phone  Given her poor prognosis and her previously stated wishes, he is in agreement with change to level 4 comfort care today  He is in agreement with stopping BiPAP (transition to nasal cannula) and stopping antibiotics  Will focus on comfort  Spoke separately with patient's granddaughter Effie Jean  - If patient is declining or dies, please contact both patient's son Yg Chong ([phone]) and patient's granddaughter Effie Jean ([phone])  Symptom Management: dyspnea, altered mental status   - morphine 2-4mg IV q 1h PRN pain or dyspnea   - haloperidol 1mg IV q 6h PRN agitation   - lorazepam 0 5mg IV q 6h PRN anxiety    History  Patient unresponsive to voice or light touch  Has been less responsive over course of day  Slightly restless  On BiPAP  Does not appear uncomfortable       Meds  all current active meds have been reviewed and current meds:   Current Facility-Administered Medications   Medication Dose Route Frequency    acetaminophen (TYLENOL) rectal suppository 650 mg  650 mg Rectal Q4H PRN    artificial tear (LUBRIFRESH P M ) ophthalmic ointment   Both Eyes BID    dextromethorphan-guaiFENesin (ROBITUSSIN DM)  mg/5 mL oral syrup 10 mL  10 mL Oral Q4H PRN    famotidine (PEPCID) injection 20 mg  20 mg Intravenous Daily    haloperidol lactate (HALDOL) injection 1 mg 1 mg Intravenous Q6H PRN    ipratropium (ATROVENT) 0 02 % inhalation solution 0 5 mg  0 5 mg Nebulization Q6H    levalbuterol (XOPENEX) inhalation solution 1 25 mg  1 25 mg Nebulization Q6H    LORazepam (ATIVAN) 2 mg/mL injection 0 5 mg  0 5 mg Intravenous Q6H PRN    methylPREDNISolone sodium succinate (Solu-MEDROL) injection 40 mg  40 mg Intravenous Q8H [address]    morphine (PF) 4 mg/mL injection 4 mg  4 mg Intravenous Q1H PRN    morphine (PF) 4 mg/mL injection 4 mg  4 mg Intravenous Once    morphine injection 2 mg  2 mg Intravenous Q1H PRN    ondansetron (ZOFRAN) injection 4 mg  4 mg Intravenous Q6H PRN    senna (SENOKOT) tablet 8 6 mg  1 tablet Oral Daily    sodium phosphate 30 mmol in dextrose 5 % 250 mL Infusion  30 mmol Intravenous Once    sodium phosphate-biphosphate (FLEET) enema 1 enema  1 enema Rectal Daily PRN       Allergies   Allergen Reactions    Cefepime Anaphylaxis    Penicillin G Benzathine Anaphylaxis       Objective  Physical Exam   Constitutional: She appears ill  Pulmonary/Chest:   On BiPAP   Neurological: She is unresponsive  No response to voice or light touch  Slightly restless   Skin: Skin is dry  Cool hands   Psychiatric: Cognition and memory are impaired  Lab Results: I have personally reviewed pertinent labs  Counseling / Coordination of Care  Total floor / unit time spent today 45 minutes  Greater than 50% of total time was spent with the patient and / or family counseling and / or coordinating of care  A description of the counseling / coordination of care: Phone conversation regarding comfort care vs ongoing medical cares with son and separately with granddaughter, spoke with attending physician, nurse, RT regarding plan of care      Yonatan Tuttle, [address] [place] [address]  Office number: [phone]

## 2017-11-22 NOTE — PLAN OF CARE
DISCHARGE PLANNING     Discharge to home or other facility with appropriate resources Progressing        INFECTION - ADULT     Absence or prevention of progression during hospitalization Progressing     Absence of fever/infection during neutropenic period Progressing        Knowledge Deficit     Patient/family/caregiver demonstrates understanding of disease process, treatment plan, medications, and discharge instructions Progressing        Nutrition/Hydration-ADULT     Nutrient/Hydration intake appropriate for improving, restoring or maintaining nutritional needs Progressing        PAIN - ADULT     Verbalizes/displays adequate comfort level or baseline comfort level Progressing        Potential for Falls     Patient will remain free of falls Progressing        Prexisting or High Potential for Compromised Skin Integrity     Skin integrity is maintained or improved Progressing        RESPIRATORY - ADULT     Achieves optimal ventilation and oxygenation Progressing        SAFETY ADULT     Maintain or return to baseline ADL function Progressing     Maintain or return mobility status to optimal level Progressing

## 2017-11-22 NOTE — CONSULTS
Pulmonary Consultation   Jesus Holder 80 y o  female MRN: [id number]  Unit/Bed#: [facility] 720-01 Encounter: [id number]      Reason for consultation:  HCAP, COPD    Requesting physician: Lai Dial DO    Impressions:   1  Acute hypoxic respiratory failure - multifactorial  2  Aspiration pneumonia with severe sepsis POA  3  COPD of unknown severity without significant exacerbation  4  Acute on chronic encephalopathy with underlying advanced dementia    Recommendations:  1  Antibiotics per ID - currently on Vanco/azetronam/flagyl Day #2  2  Pt at present time is essentially unresponsive  Would continue BiPAP for now and titrate off if mental status improves or she becomes more awake  Discussed with RT  Palliative care is following and after last hospitalization in August per notes she was sent to nursing home on hospice care  CM to investigate further today  3   Taper steroids quickly as no sign of acute COPD exacerbation  4  Continue nebulizers Q6hrs  5  Strict NPO at this time  IVF stopped per SLIM  6  Unfortunately prognosis appears very poor as at baseline pt with very advanced dementia  She is hospice appropriate  If family is agreeable, would transition to nasal cannula and keep comfortable  Discussed with Dr Cheyenne Jay of [facility]      History of Present Illness   HPI:  Jesus Holder is a 80 y o  female who initially presented to [address] [place] [address] on the evening of 11/20/17 with chills, increased cough and emesis x 2  She was found to have room air resting saturations in the high 80s with tachycardia and tachypnea  WBCs were elevated at almost 14,000 and LA at 2 7  Imaging revealed infiltrate and due to increased work of breathing she was placed on BiPAP  There were no beds at [facility] so she was transferred here for further care  Of note, she has advanced underlying dementia  Upon arrival here, she was very agitated and combative with staff  She required Haldol    She was placed on broad spectrum antibiotics and ID evaluation was requested  She has been afebrile  Yesterday morning, a rapid response was called for worsening lethargy, unresponsiveness, and agonal breathing despite still being on BiPAP  She also was noted to have a rash thought to be related to cefepime  FIO2 was increased and she was given large amounts of benadryl, pepcid and steroids  Cefepime was stopped  She was given IVF and CT Head was negative for any acute intracranial abnormalities  She has remained on BiPAP since admission although FIO2 has been able to be decreased to 2L  A pulmonary evaluation has been requested  She continues to remain unresponsive to verbal or painful stimuli  She does not open eyes at all  She appears comfortable, but is unable to answer any questions  Per last discharge summary from August 2017, she was sent back to her nursing facility under hospice care  Palliative care has been following during this admission and at present family is willing to treat with antibiotics, steroids, nebulizers and BiPAP for another day or 2  They want no escalation of care and she remains a DNR/DNI level 3  If no improvement in the next day or 2 per notes, family will transition to hospice/comfort care  Review of systems:  Unable to obtain as pt unresponsive      Historical Information   Past Medical History:   Diagnosis Date    [facility]     Dementia 8/5/2017    Depression     Disease of thyroid gland     Hypothyroidism 8/5/2017    Osteoarthritis     Osteoporosis      History reviewed  No pertinent surgical history  History reviewed  No pertinent family history      Occupational history: Unable to obtain as pt nonresponsive    Tobacco history: Unable to obtain as pt nonresponsive    Meds/Allergies   Current Facility-Administered Medications   Medication Dose Route Frequency    acetaminophen (TYLENOL) rectal suppository 650 mg  650 mg Rectal Q4H PRN    artificial tear (LUBRIFRESH P M ) ophthalmic ointment   Both Eyes BID    aztreonam (AZACTAM) 1,000 mg in sodium chloride 0 9 % 50 mL IVPB  1,000 mg Intravenous Q8H    dextromethorphan-guaiFENesin (ROBITUSSIN DM)  mg/5 mL oral syrup 10 mL  10 mL Oral Q4H PRN    docusate sodium (COLACE) capsule 100 mg  100 mg Oral BID    enoxaparin (LOVENOX) subcutaneous injection 30 mg  30 mg Subcutaneous Daily    famotidine (PEPCID) injection 20 mg  20 mg Intravenous Daily    haloperidol lactate (HALDOL) injection 2 mg  2 mg Intramuscular Q6H PRN    hydrALAZINE (APRESOLINE) injection 5 mg  5 mg Intravenous Q6H PRN    ipratropium (ATROVENT) 0 02 % inhalation solution 0 5 mg  0 5 mg Nebulization Q6H    levalbuterol (XOPENEX) inhalation solution 1 25 mg  1 25 mg Nebulization Q6H    magnesium hydroxide (MILK OF MAGNESIA) 400 mg/5 mL oral suspension 15 mL  15 mL Oral HS    methylPREDNISolone sodium succinate (Solu-MEDROL) injection 60 mg  60 mg Intravenous Q6H JENNY    metroNIDAZOLE (FLAGYL) IVPB (premix) 500 mg  500 mg Intravenous Q8H    morphine injection 2 mg  2 mg Intravenous Q2H PRN    ondansetron (ZOFRAN) injection 4 mg  4 mg Intravenous Q6H PRN    senna (SENOKOT) tablet 8 6 mg  1 tablet Oral Daily    sodium phosphate 30 mmol in dextrose 5 % 250 mL Infusion  30 mmol Intravenous Once    sodium phosphate-biphosphate (FLEET) enema 1 enema  1 enema Rectal Daily PRN    vancomycin (VANCOCIN) IVPB (premix) 750 mg  15 mg/kg Intravenous Q24H     Prescriptions Prior to Admission   Medication    acetaminophen (TYLENOL) 325 mg tablet    artificial tear (LUBRIFRESH P M ) 83-15 % ophthalmic ointment    bisacodyl (BISAC-EVAC) 10 mg suppository    dextromethorphan-guaiFENesin (ROBITUSSIN DM)  mg/5 mL syrup    ipratropium-albuterol (DUO-NEB) 0 5-2 5 mg/mL    magnesium hydroxide (MILK OF MAGNESIA) 400 mg/5 mL oral suspension    morphine sulfate, concentrate, 100 MG/5ML concentrated solution    Multiple Vitamins-Minerals (PRESERVISION AREDS 2) CAPS    nitrofurantoin (MACROBID) 100 mg capsule    Sodium Phosphates (ENEMA DISPOSABLE RE)     Allergies   Allergen Reactions    Cefepime Anaphylaxis    Penicillin G Benzathine Anaphylaxis       Vitals: Blood pressure 140/62, pulse 77, temperature (!) 97 °F (36 1 °C), temperature source Axillary, resp  rate 22, height 5' 1 5" (1 562 m), weight 57 1 kg (125 lb 14 1 oz), SpO2 99 %  , 12/5 BiPAP with 2L, Body mass index is 23 4 kg/m²  Intake/Output Summary (Last 24 hours) at 11/22/17 1055  Last data filed at 11/22/17 0049   Gross per 24 hour   Intake              225 ml   Output              145 ml   Net               80 ml       Physical exam:    General Appearance:    Unresponsive  Will not open eyes or follow any commands  She appears comfortable on BiPAP   Head/eyes:    Normocephalic, without obvious abnormality, atraumatic,         PERRL, extraocular muscles intact, no scleral icterus    Nose:   Nares normal, septum midline, mucosa dry  Throat:   Deferred as on BiPAP   Neck:   Supple, trachea midline, no adenopathy; no carotid    bruit or JVD   Lungs:     Poor effort with decreased breath sounds heard anteriorly  No wheezes, rhonchi or rales at present  Chest Wall:    No tenderness or deformity    Heart:    Regular rate and rhythm, S1 and S2 normal, no murmur, rub   or gallop   Abdomen:     Soft, non-tender, bowel sounds active all four quadrants,     no masses, no organomegaly   Extremities:   Extremities normal, atraumatic, no cyanosis or edema, wearing SCDs bilaterally   Skin:   Warm, dry, turgor normal, no rashes or lesions   Lymph nodes:   Cervical and supraclavicular nodes normal   Neurologic:   Unresponsive, unable to evaluate         Labs: I have personally reviewed pertinent lab results  , ABG: No results found for: PHART, MHM6FMN, PO2ART, MMI0JRG, H5MTHEBT, BEART, SOURCE, BNP: No results found for: BNP, CBC:   Lab Results   Component Value Date    WBC 18 81 (H) 11/22/2017    HGB 11 8 11/22/2017    HCT 36 3 11/22/2017    MCV 98 11/22/2017     11/22/2017    MCH 31 7 11/22/2017    MCHC 32 5 11/22/2017    RDW 16 6 (H) 11/22/2017    MPV 10 4 11/22/2017   , CMP:   Lab Results   Component Value Date     11/22/2017    K 4 1 11/22/2017     (H) 11/22/2017    CO2 20 (L) 11/22/2017    ANIONGAP 7 11/22/2017    BUN 23 11/22/2017    CREATININE 0 89 11/22/2017    GLUCOSE 134 11/22/2017    CALCIUM 8 2 (L) 11/22/2017    EGFR 57 11/22/2017   , PT/INR: No results found for: PT, INR, Troponin: No results found for: TROPONINI     MRSA Culture - PENDING    Flu A, B, RSV - NEGATIVE    Imaging and other studies: I have personally reviewed pertinent films in PACS     PCXR 11/21/17  FINDINGS:          Stable cardiac mediastinal silhouette      Increased interstitial and vascular congestion  Probable small effusions  No discrete pneumothorax      Stable osseous structures  Osteopenia  Multiple compression deformities with vertebroplasty      IMPRESSION:     Increased interstitial and vascular congestion  Probable small effusions    Pulmonary function testing: No PFTs to be reviewed    EKG, Pathology, and Other Studies: I have personally reviewed pertinent reports        Code Status: Level 3 - DNAR and DNI      Sonya Martin PA-C

## 2017-11-22 NOTE — PLAN OF CARE
Problem: DISCHARGE PLANNING - CARE MANAGEMENT  Goal: Discharge to post-acute care or home with appropriate resources  INTERVENTIONS:  - Conduct assessment to determine patient/family and health care team treatment goals, and need for post-acute services based on payer coverage, community resources, and patient preferences, and barriers to discharge  - Address psychosocial, clinical, and financial barriers to discharge as identified in assessment in conjunction with the patient/family and health care team  - Arrange appropriate level of post-acute services according to patient's   needs and preference and payer coverage in collaboration with the physician and health care team  - Communicate with and update the patient/family, physician, and health care team regarding progress on the discharge plan  - Arrange appropriate transportation to post-acute venues  - Plan for discharge to [place]    Outcome: Progressing

## 2017-11-22 NOTE — PROGRESS NOTES
[facility] Internal Medicine Progress Note  Patient: Charly Zhu 80 y o  female   MRN: [id number]  PCP: Marcell Scales MD  Unit/Bed#: [facility] 720-01 Encounter: [id number]  Date Of Visit: 11/22/17    Assessment:    Principal Problem:    Severe sepsis ([facility] 75 )  Active Problems:    COPD (chronic obstructive pulmonary disease) ([place] [address] )    Acute respiratory failure with hypoxia ([place] [address] )    HCAP (healthcare-associated pneumonia)    Ambulatory dysfunction    Hypothyroidism    Acute encephalopathy    BECKY (acute kidney injury) ([place] [address] )    Alzheimer's dementia with behavioral disturbance      Plan:    Severe sepsis 2/2 Asp PNA: appreciate ID consult  Pt appeared to have Cefepime allergy  Currently onAztreonam, Flagyl, and Vanco   Can d/c Vanco if MRSA swab negative  U strep and legionella pending  Flu neg  LA resolved with IVF      COPD exac: Pt was on solumderol 125 mg q6h for possible allergic reaction - consulted pulm to help with steroids - steroids weaned to 40 mg q8h  Also on resp protocol      Acute hypoxic resp fail on bipap: pt dnr/dni  C/w Bipap  Pulm consulted  Will try to wean bipap over next 48h, if unable, will need to discuss comfort care      ?allergic rxn to Cefepime: Pt was on solumedrol 125 q6h and pepcid and was on benadryl - benadryl stopped        Dementia:  Pt at baseline not fully oriented     Acute encephalopathy: CT head WNL  Suspect 2/2 severe sepsis and resp fail     GOC: can c/w current tx  Level 3 DNR/DNI with goal of keeping comfortable  No pressors or central lines  Pt not in pain  Palliative care following  Morphine prn  VTE Pharmacologic Prophylaxis:   Pharmacologic: Enoxaparin (Lovenox)  Mechanical VTE Prophylaxis in Place: Yes    Patient Centered Rounds: I have performed bedside rounds with nursing staff today  Discussions with Specialists or Other Care Team Provider: pulm    Education and Discussions with Family / Patient: pt and son    Time Spent for Care: 30 minutes    More than 50% of total time spent on counseling and coordination of care as described above  Current Length of Stay: 2 day(s)    Current Patient Status: Inpatient   Certification Statement: The patient will continue to require additional inpatient hospital stay due to need for abx and bipap    Discharge Plan / Estimated Discharge Date: pending course    Code Status: Level 3 - DNAR and DNI      Subjective:   Pt seen and examined  Responsive briefly to verbal stimuli  Was on bipap o/n  Objective:     Vitals:   Temp (24hrs), Av 1 °F (36 7 °C), Min:97 °F (36 1 °C), Max:98 7 °F (37 1 °C)    HR:  [77-98] 77  Resp:  [20-22] 22  BP: (118-199)/(50-77) 140/62  SpO2:  [96 %-100 %] 99 %  Body mass index is 23 4 kg/m²  Input and Output Summary (last 24 hours): Intake/Output Summary (Last 24 hours) at 17 0857  Last data filed at 17 0049   Gross per 24 hour   Intake          1731 25 ml   Output              145 ml   Net          1586 25 ml       Physical Exam:     Physical Exam   Constitutional: No distress  HENT:   Head: Normocephalic and atraumatic  Eyes: Conjunctivae and EOM are normal    Neck: Normal range of motion  Neck supple  Cardiovascular: Normal rate and regular rhythm  Pulmonary/Chest:   Decreased BS 2/2 poor effort   Abdominal: Soft  Bowel sounds are normal  She exhibits no distension  There is no tenderness  Musculoskeletal: She exhibits no edema or deformity  Neurological:   Briefly responsive to verbal stimuli   Skin: Skin is warm and dry  She is not diaphoretic         Additional Data:     Labs:      Results from last 7 days  Lab Units 17  0558  17  1602   WBC Thousand/uL 18 81*  < > 13 91*   HEMOGLOBIN g/dL 11 8  < > 15 2   I STAT HEMOGLOBIN   --   < >  --    HEMATOCRIT % 36 3  < > 47 9*   PLATELETS Thousands/uL 205  < > 369   LYMPHO PCT %  --   --  5*   MONO PCT MAN %  --   --  2*   EOSINO PCT MANUAL %  --   --  0   < > = values in this interval not displayed  Results from last 7 days  Lab Units 11/22/17  0558  11/20/17  1602   SODIUM mmol/L 145  < > 141   POTASSIUM mmol/L 4 1  < > 4 1   CHLORIDE mmol/L 118*  < > 105   CO2 mmol/L 20*  < > 22   BUN mg/dL 23  < > 23   CREATININE mg/dL 0 89  < > 1 36*   CALCIUM mg/dL 8 2*  < > 10 2*   TOTAL PROTEIN g/dL  --   --  8 3*   BILIRUBIN TOTAL mg/dL  --   --  0 60   ALK PHOS U/L  --   --  197*   ALT U/L  --   --  33   AST U/L  --   --  52*   GLUCOSE RANDOM mg/dL 134  < > 115   GLUCOSE, ISTAT   --   < >  --    < > = values in this interval not displayed  Results from last 7 days  Lab Units 11/20/17  1826   INR  1 06       * I Have Reviewed All Lab Data Listed Above  * Additional Pertinent Lab Tests Reviewed: Andry [facility] Admission Reviewed        Recent Cultures (last 7 days):       Results from last 7 days  Lab Units 11/20/17  2321 11/20/17  1730 11/20/17  1724 11/20/17  1723   BLOOD CULTURE   --   --  No Growth at 24 hrs  No Growth at 24 hrs  URINE CULTURE   --  No Growth <1000 cfu/mL  --   --    INFLUENZA A PCR  None Detected  --   --   --    INFLUENZA B PCR  None Detected  --   --   --    RSV PCR  None Detected  --   --   --        Last 24 Hours Medication List:     artificial tear  Both Eyes BID   aztreonam 1,000 mg Intravenous Q8H   docusate sodium 100 mg Oral BID   enoxaparin 30 mg Subcutaneous Daily   famotidine 20 mg Intravenous Daily   ipratropium 0 5 mg Nebulization Q6H   levalbuterol 1 25 mg Nebulization Q6H   magnesium hydroxide 15 mL Oral HS   methylPREDNISolone sodium succinate 60 mg Intravenous Q6H JENNY   metroNIDAZOLE 500 mg Intravenous Q8H   senna 1 tablet Oral Daily   sodium phosphate 30 mmol Intravenous Once   vancomycin 15 mg/kg Intravenous Q24H        Today, Patient Was Seen By: Casimiro Ball DO    ** Please Note: This note has been constructed using a voice recognition system   **

## 2017-11-23 NOTE — PLAN OF CARE

## 2017-11-23 NOTE — PLAN OF CARE
DISCHARGE PLANNING     Discharge to home or other facility with appropriate resources Not Progressing        DISCHARGE PLANNING - CARE MANAGEMENT     Discharge to post-acute care or home with appropriate resources Not Progressing        INFECTION - ADULT     Absence or prevention of progression during hospitalization Not Progressing     Absence of fever/infection during neutropenic period Not Progressing        Knowledge Deficit     Patient/family/caregiver demonstrates understanding of disease process, treatment plan, medications, and discharge instructions Not Progressing        Nutrition/Hydration-ADULT     Nutrient/Hydration intake appropriate for improving, restoring or maintaining nutritional needs Not Progressing        PAIN - ADULT     Verbalizes/displays adequate comfort level or baseline comfort level Not Progressing        Potential for Falls     Patient will remain free of falls Not Progressing        Prexisting or High Potential for Compromised Skin Integrity     Skin integrity is maintained or improved Not Progressing        RESPIRATORY - ADULT     Achieves optimal ventilation and oxygenation Not Progressing        SAFETY ADULT     Maintain or return to baseline ADL function Not Progressing     Maintain or return mobility status to optimal level Not Progressing

## 2017-11-23 NOTE — PROGRESS NOTES
[facility] Internal Medicine Progress Note  Patient: Raul Baugh 80 y o  female   MRN: [id number]  PCP: Randall Aguilera MD  Unit/Bed#: [facility] 720-01 Encounter: [id number]  Date Of Visit: 17    Assessment:    Principal Problem:    Severe sepsis ([facility] )  Active Problems:    COPD (chronic obstructive pulmonary disease) ([facility] 75 )    Acute respiratory failure with hypoxia ([facility] 75 )    HCAP (healthcare-associated pneumonia)    Ambulatory dysfunction    Hypothyroidism    Acute encephalopathy    BECKY (acute kidney injury) ([facility] 75 )    Alzheimer's dementia with behavioral disturbance      Plan:  Pt now comfort care  No more labs  Morphine prn distress  Palliative care following  Rapid wean off solumderol        VTE Pharmacologic Prophylaxis:   Pharmacologic: Pharmacologic VTE Prophylaxis contraindicated due to comfort care  Mechanical VTE Prophylaxis in Place: No    Patient Centered Rounds: I have performed bedside rounds with nursing staff today  Discussions with Specialists or Other Care Team Provider: no    Education and Discussions with Family / Patient: not today    Time Spent for Care: 30 minutes  More than 50% of total time spent on counseling and coordination of care as described above  Current Length of Stay: 3 day(s)    Current Patient Status: Inpatient   Certification Statement: The patient will continue to require additional inpatient hospital stay due to unstable to leave    Discharge Plan / Estimated Discharge Date: poor prognosis  Comfort care    Code Status: Level 4 - Comfort Care      Subjective:   Pt seen and examined  She is comfortable  No o/n events  Objective:     Vitals:   Temp (24hrs), Av 5 °F (36 4 °C), Min:97 5 °F (36 4 °C), Max:97 5 °F (36 4 °C)    HR:  [81-90] 90  Resp:  [20-22] 22  BP: (126)/(85) 126/85  SpO2:  [91 %-96 %] 94 %  Body mass index is 23 4 kg/m²  Input and Output Summary (last 24 hours):        Intake/Output Summary (Last 24 hours) at 17 6597  Last data filed at 11/23/17 0800   Gross per 24 hour   Intake                0 ml   Output                0 ml   Net                0 ml       Physical Exam:     Physical Exam   Constitutional: She appears well-developed  No distress  HENT:   Head: Normocephalic and atraumatic  Eyes: Conjunctivae and EOM are normal    Neck: Normal range of motion  Neck supple  Cardiovascular: Normal rate and regular rhythm  Pulmonary/Chest: No respiratory distress  Scattered rhonchi   Abdominal: Soft  Bowel sounds are normal  She exhibits no distension  There is no tenderness  Musculoskeletal: She exhibits no edema or deformity  Skin: Skin is warm and dry  She is not diaphoretic  Additional Data:     Labs:      Results from last 7 days  Lab Units 11/22/17  0558  11/20/17  1602   WBC Thousand/uL 18 81*  < > 13 91*   HEMOGLOBIN g/dL 11 8  < > 15 2   I STAT HEMOGLOBIN   --   < >  --    HEMATOCRIT % 36 3  < > 47 9*   PLATELETS Thousands/uL 205  < > 369   LYMPHO PCT %  --   --  5*   MONO PCT MAN %  --   --  2*   EOSINO PCT MANUAL %  --   --  0   < > = values in this interval not displayed  Results from last 7 days  Lab Units 11/22/17  0558  11/20/17  1602   SODIUM mmol/L 145  < > 141   POTASSIUM mmol/L 4 1  < > 4 1   CHLORIDE mmol/L 118*  < > 105   CO2 mmol/L 20*  < > 22   BUN mg/dL 23  < > 23   CREATININE mg/dL 0 89  < > 1 36*   CALCIUM mg/dL 8 2*  < > 10 2*   TOTAL PROTEIN g/dL  --   --  8 3*   BILIRUBIN TOTAL mg/dL  --   --  0 60   ALK PHOS U/L  --   --  197*   ALT U/L  --   --  33   AST U/L  --   --  52*   GLUCOSE RANDOM mg/dL 134  < > 115   GLUCOSE, ISTAT   --   < >  --    < > = values in this interval not displayed  Results from last 7 days  Lab Units 11/20/17  1826   INR  1 06       * I Have Reviewed All Lab Data Listed Above  * Additional Pertinent Lab Tests Reviewed:  [address] Admission Reviewed        Recent Cultures (last 7 days):       Results from last 7 days  Lab Units 11/21/17  0329 11/20/17  2321 11/20/17  1730 11/20/17  1724 11/20/17  1723   BLOOD CULTURE   --   --   --  No Growth at 48 hrs  No Growth at 48 hrs  URINE CULTURE  No Growth <1000 cfu/mL  --  No Growth <1000 cfu/mL  --   --    INFLUENZA A PCR   --  None Detected  --   --   --    INFLUENZA B PCR   --  None Detected  --   --   --    RSV PCR   --  None Detected  --   --   --        Last 24 Hours Medication List:     artificial tear  Both Eyes BID   methylPREDNISolone sodium succinate 40 mg Intravenous Q12H [address]   morphine injection 4 mg Intravenous Q6H [address]        Today, Patient Was Seen By: Sarah Crawford DO    ** Please Note: This note has been constructed using a voice recognition system   **

## 2017-11-23 NOTE — PROGRESS NOTES
Progress Note - Palliative & Supportive Care  Mary Kate Washington  80 y o   female  [facility] 720-01   MRN: [id number]  Encounter: [id number]     Assessment  Patient Active Problem List   Diagnosis    [facility]    Severe sepsis ([facility] )    Acute respiratory failure with hypoxia ([place] [address] )    HCAP (healthcare-associated pneumonia)    Ambulatory dysfunction    Respiratory distress    Hypothyroidism    Dementia    Acute encephalopathy    BECKY (acute kidney injury) ([place] [address] )    Alzheimer's dementia with behavioral disturbance     Plan:  Goals of Care:    - comfort    - If patient is declining or dies, please contact both patient's son Belen Phillips ([phone]) and patient's granddaughter Barbi Caceres ([phone])  Symptom Management: dyspnea, altered mental status   - schedule IV morphine 4 mg q6H ATC   - morphine 2 IV q 1h PRN pain or dyspnea   - haloperidol 1mg IV q 1h PRN agitation   - lorazepam 0 5mg IV q 1h PRN anxiety   - rapid wean off of Solumedrol   - D/C other erroneous medications   - added PRN dulcolax suppository for constipation    History  Patient appears quite uncomfortable  She is grimacing and clenching her sheets and is moaning  She cannot be soothed by touch or voice       Meds  all current active meds have been reviewed and current meds:   Current Facility-Administered Medications   Medication Dose Route Frequency    acetaminophen (TYLENOL) rectal suppository 650 mg  650 mg Rectal Q4H PRN    artificial tear (LUBRIFRESH P M ) ophthalmic ointment   Both Eyes BID    bisacodyl (DULCOLAX) rectal suppository 10 mg  10 mg Rectal Daily PRN    dextromethorphan-guaiFENesin (ROBITUSSIN DM)  mg/5 mL oral syrup 10 mL  10 mL Oral Q4H PRN    haloperidol lactate (HALDOL) injection 1 mg  1 mg Intravenous Q1H PRN    LORazepam (ATIVAN) 2 mg/mL injection 0 5 mg  0 5 mg Intravenous Q1H PRN    methylPREDNISolone sodium succinate (Solu-MEDROL) injection 40 mg  40 mg Intravenous Q12H [address]    morphine (PF) 4 mg/mL injection 4 mg  4 mg Intravenous Q6H [address]    morphine injection 2 mg  2 mg Intravenous Q1H PRN    ondansetron (ZOFRAN) injection 4 mg  4 mg Intravenous Q6H PRN    sodium phosphate-biphosphate (FLEET) enema 1 enema  1 enema Rectal Daily PRN       Allergies   Allergen Reactions    Cefepime Anaphylaxis    Penicillin G Benzathine Anaphylaxis       Objective  Physical Exam   Gen- appears quite uncomfortable, PAINAD 4, facial grimacing and clenching of fists  CV- irregular, tachycardic  Resp- no apnea  GI- SNTTP  Ext- cool in distal extremities  Skin- some discoloration noted in b/l legs  PPS- 10%    Lab Results: I have personally reviewed pertinent labs  Counseling / Coordination of Care  Total floor / unit time spent today 25 minutes  Greater than 50% of total time was spent with the patient and / or family counseling and / or coordinating of care  A description of the counseling / coordination of care: Phone conversation regarding comfort care vs ongoing medical cares with son and separately with granddaughter, spoke with attending physician, nurse, RT regarding plan of care      Ivan López DO  Palliative & Supportive Care  Office number: [phone]

## 2017-11-24 NOTE — CASE MANAGEMENT
Continued Stay Review    Date: 2017    Vital Signs: /82   Pulse 79   Temp 97 6 °F (36 4 °C) (Axillary)   Resp (!) 24   Ht 5' 1 5" (1 562 m)   Wt 57 1 kg (125 lb 14 1 oz)   SpO2 97%   BMI 23 40 kg/m²     Medications:   Scheduled Meds:   artificial tear  Both Eyes BID   [START ON 2017] methylPREDNISolone sodium succinate 40 mg Intravenous Daily   morphine injection 4 mg Intravenous Q6H [address]     Continuous Infusions:    PRN Meds:   acetaminophen    bisacodyl    dextromethorphan-guaiFENesin    haloperidol lactate    LORazepam    morphine injection    ondansetron    sodium phosphate-biphosphate    Age/Sex: 80 y o  female     Assessment/Plan:   Assessment:     Principal Problem:    Severe sepsis (HCC)  Active Problems:    COPD (chronic obstructive pulmonary disease) ([facility])    Acute respiratory failure with hypoxia (HCC)    HCAP (healthcare-associated pneumonia)    Ambulatory dysfunction    Hypothyroidism    Acute encephalopathy    BECKY (acute kidney injury) ([facility] )    Alzheimer's dementia with behavioral disturbance        Plan:   Pt now comfort care  No more labs  Morphine prn distress  Palliative care following  Rapid wean off solumderol     Current Length of Stay: 4 day(s)     Current Patient Status: Inpatient   Certification Statement: The patient will continue to require additional inpatient hospital stay due to unsafe to d/c - pt expected to  in hours to days     Discharge Plan / Estimated Discharge Date: pt expected to  in hours to days     Code Status: Level 4 - Comfort Care

## 2017-11-24 NOTE — PROGRESS NOTES
11/24/17 1000   Psychosocial   Patient Behaviors/Mood Unable to assess   Length of Time/Family Visitation 0-5 min   Plan of Care   Comments Nurse paged to speak with son  He was not in room when arrived and PT sleeping   Will refer to [facility] Completed by: Unit visit

## 2017-11-24 NOTE — PROGRESS NOTES
[facility] Internal Medicine Progress Note  Patient: Nusrat Pittman 80 y o  female   MRN: [id number]  PCP: Phu English MD  Unit/Bed#: [facility] 720-01 Encounter: [id number]  Date Of Visit: 17    Assessment:    Principal Problem:    Severe sepsis ([facility] 75 )  Active Problems:    COPD (chronic obstructive pulmonary disease) ([place] [address] )    Acute respiratory failure with hypoxia ([place] [address] )    HCAP (healthcare-associated pneumonia)    Ambulatory dysfunction    Hypothyroidism    Acute encephalopathy    BECKY (acute kidney injury) ([place] [address] )    Alzheimer's dementia with behavioral disturbance      Plan:    Pt now comfort care  No more labs  Morphine prn distress  Palliative care following  Rapid wean off solumderol      VTE Pharmacologic Prophylaxis:   Pharmacologic: Pharmacologic VTE Prophylaxis contraindicated due to comfort care  Mechanical VTE Prophylaxis in Place: No    Patient Centered Rounds: I have performed bedside rounds with nursing staff today  Discussions with Specialists or Other Care Team Provider: no    Education and Discussions with Family / Patient: no    Time Spent for Care: 30 minutes  More than 50% of total time spent on counseling and coordination of care as described above  Current Length of Stay: 4 day(s)    Current Patient Status: Inpatient   Certification Statement: The patient will continue to require additional inpatient hospital stay due to unsafe to d/c - pt expected to  in hours to days    Discharge Plan / Estimated Discharge Date: pt expected to  in hours to days    Code Status: Level 4 - Comfort Care      Subjective:   Pt seen and examined  She is comfortable  Objective:     Vitals:   Temp (24hrs), Av 3 °F (36 3 °C), Min:97 °F (36 1 °C), Max:97 6 °F (36 4 °C)    HR:  [74-82] 79  Resp:  [20-24] 24  BP: (112-190)/(76-90) 154/82  SpO2:  [93 %-98 %] 97 %  Body mass index is 23 4 kg/m²  Input and Output Summary (last 24 hours):        Intake/Output Summary (Last 24 hours) at 11/24/17 0920  Last data filed at 11/23/17 1751   Gross per 24 hour   Intake                0 ml   Output                0 ml   Net                0 ml       Physical Exam:     Physical Exam   Constitutional: No distress  HENT:   Head: Normocephalic and atraumatic  Eyes: Conjunctivae and EOM are normal    Neck: Normal range of motion  Neck supple  Cardiovascular: Normal rate and regular rhythm  Pulmonary/Chest: Effort normal    Diffuse rhonchi   Abdominal: Soft  Bowel sounds are normal  She exhibits no distension  There is no tenderness  Musculoskeletal: She exhibits no edema or deformity  Skin: Skin is warm and dry  She is not diaphoretic  Additional Data:     Labs:      Results from last 7 days  Lab Units 11/22/17  0558  11/20/17  1602   WBC Thousand/uL 18 81*  < > 13 91*   HEMOGLOBIN g/dL 11 8  < > 15 2   I STAT HEMOGLOBIN   --   < >  --    HEMATOCRIT % 36 3  < > 47 9*   PLATELETS Thousands/uL 205  < > 369   LYMPHO PCT %  --   --  5*   MONO PCT MAN %  --   --  2*   EOSINO PCT MANUAL %  --   --  0   < > = values in this interval not displayed  Results from last 7 days  Lab Units 11/22/17  0558  11/20/17  1602   SODIUM mmol/L 145  < > 141   POTASSIUM mmol/L 4 1  < > 4 1   CHLORIDE mmol/L 118*  < > 105   CO2 mmol/L 20*  < > 22   BUN mg/dL 23  < > 23   CREATININE mg/dL 0 89  < > 1 36*   CALCIUM mg/dL 8 2*  < > 10 2*   TOTAL PROTEIN g/dL  --   --  8 3*   BILIRUBIN TOTAL mg/dL  --   --  0 60   ALK PHOS U/L  --   --  197*   ALT U/L  --   --  33   AST U/L  --   --  52*   GLUCOSE RANDOM mg/dL 134  < > 115   GLUCOSE, ISTAT   --   < >  --    < > = values in this interval not displayed  Results from last 7 days  Lab Units 11/20/17  1826   INR  1 06       * I Have Reviewed All Lab Data Listed Above  * Additional Pertinent Lab Tests Reviewed:  Andry 66 Admission Reviewed        Recent Cultures (last 7 days):       Results from last 7 days  Lab Units 11/21/17  0329 11/20/17  5571 11/20/17  1730 11/20/17  1724 11/20/17  1723   BLOOD CULTURE   --   --   --  No Growth at 72 hrs  No Growth at 72 hrs  URINE CULTURE  No Growth <1000 cfu/mL  --  No Growth <1000 cfu/mL  --   --    INFLUENZA A PCR   --  None Detected  --   --   --    INFLUENZA B PCR   --  None Detected  --   --   --    RSV PCR   --  None Detected  --   --   --        Last 24 Hours Medication List:     artificial tear  Both Eyes BID   methylPREDNISolone sodium succinate 40 mg Intravenous Q12H [facility]   morphine injection 4 mg Intravenous Q6H [facility]        Today, Patient Was Seen By: Yulia Meyer DO    ** Please Note: This note has been constructed using a voice recognition system   **

## 2017-11-25 NOTE — PROGRESS NOTES
Called by RN to pronounce pt   Eyes: pupils nonreactive  CV: absent heart sounds  Lungs: Absent BS  Ext: absent pulses    Pt  at 0935  Called son Miguel Angel Pay  He wants organ donated, but does not want autopsy  RN will let SNF know

## 2017-11-25 NOTE — PROGRESS NOTES
Pt lethargic  Mouth and lips dry  Oral care given  Respirations shallow and non labored  o2 on at 2l nasal cannula  Incontinent and changed   Turned for comfort  Extremities cool, extra blankets applied for comfort   Will monitor

## 2017-11-26 NOTE — DISCHARGE SUMMARY
Discharge Summary - [facility] Internal Medicine    Patient Information: Brant Longo 80 y o  female MRN: [id number]  Unit/Bed#: [facility] 720-01 Encounter: [id number]    Discharging Physician / Practitioner: Casimiro Ball DO  PCP: Charles Grigsby MD  Admission Date: 2017  Discharge Date: 17    Reason for Admission: severe sepsis    Discharge Diagnoses:     Principal Problem:    Severe sepsis ([place] [address] )  Active Problems:    COPD (chronic obstructive pulmonary disease) ([place] [address] )    Acute respiratory failure with hypoxia ([place] [address] )    HCAP (healthcare-associated pneumonia)    Ambulatory dysfunction    Hypothyroidism    Acute encephalopathy    BECKY (acute kidney injury) ([place] [address] )    Alzheimer's dementia with behavioral disturbance  Resolved Problems:    * No resolved hospital problems  *      Consultations During Hospital Stay:  · Dr Luis Daniel Shirley - palliative care  · Dr Mikki Watters - ID  · Dr Natalio Sheikh - pulm    Procedures Performed:     · none    Significant Findings / Test Results:     · CTCAP: PNA    Incidental Findings:   · none     Test Results Pending at Discharge (will require follow up):   · none     Outpatient Tests Requested:  · none    Complications:  Rapid response  due to resp fail and encephaloipathy likely 2/2 PNA    Hospital Course:     Brant Longo is a 80 y o  female patient who originally presented to the hospital on 2017 due to severe sepsis 2/2 PNA  Pt originally on   Cefepime, Flagyl, and Vanco   Rapid response called  As pt had resp distress requiring bipap and had encephalopathy  Thought possibly due to allergic rxn to cefepime as rash noted, so tx with solumedrol, pepcid, and benadryl  Cefepime switched to Aztreonam, but reps status and encephalopathy did not improve  This was likely 2/2 PNA and COPD  Steroids and abx continued, but pt did not improve  She was eventually transitioned to comfort care  PT  on       Condition at Discharge:      Discharge Day Visit / Exam: * Please refer to separate progress note for these details *    Discussion with Family: d/w son    Disposition:     Other:     For Discharges to [address] [place] [address]:   · Not Applicable to this Patient - Not Applicable to this Patient    Planned Readmission: no    Discharge Statement:  I spent 25 minutes discharging the patient  This time was spent on the day of discharge  I had direct contact with the patient on the day of discharge  Greater than 50% of the total time was spent examining patient, answering all patient questions, arranging and discussing plan of care with patient as well as directly providing post-discharge instructions  Additional time then spent on discharge activities  Discharge Medications:  None    ** Please Note: This note has been constructed using a voice recognition system   **